# Patient Record
Sex: MALE | Race: WHITE | NOT HISPANIC OR LATINO | Employment: OTHER | URBAN - METROPOLITAN AREA
[De-identification: names, ages, dates, MRNs, and addresses within clinical notes are randomized per-mention and may not be internally consistent; named-entity substitution may affect disease eponyms.]

---

## 2019-06-22 ENCOUNTER — HOSPITAL ENCOUNTER (INPATIENT)
Facility: HOSPITAL | Age: 64
LOS: 2 days | Discharge: HOME | DRG: 432 | End: 2019-06-25
Attending: EMERGENCY MEDICINE | Admitting: INTERNAL MEDICINE
Payer: COMMERCIAL

## 2019-06-22 DIAGNOSIS — K76.6 PORTAL HYPERTENSION (HCC): ICD-10-CM

## 2019-06-22 DIAGNOSIS — K74.60 CIRRHOSIS OF LIVER (HCC): ICD-10-CM

## 2019-06-22 DIAGNOSIS — K92.2 GI BLEED: Primary | ICD-10-CM

## 2019-06-22 LAB
BASOPHILS # BLD AUTO: 0.08 THOUSANDS/ΜL (ref 0–0.1)
BASOPHILS NFR BLD AUTO: 1 % (ref 0–1)
EOSINOPHIL # BLD AUTO: 0.01 THOUSAND/ΜL (ref 0–0.61)
EOSINOPHIL NFR BLD AUTO: 0 % (ref 0–6)
ERYTHROCYTE [DISTWIDTH] IN BLOOD BY AUTOMATED COUNT: 14.2 % (ref 11.6–15.1)
HCT VFR BLD AUTO: 35.2 % (ref 36.5–49.3)
HGB BLD-MCNC: 12.3 G/DL (ref 12–17)
IMM GRANULOCYTES # BLD AUTO: 0.02 THOUSAND/UL (ref 0–0.2)
IMM GRANULOCYTES NFR BLD AUTO: 0 % (ref 0–2)
LYMPHOCYTES # BLD AUTO: 2.11 THOUSANDS/ΜL (ref 0.6–4.47)
LYMPHOCYTES NFR BLD AUTO: 24 % (ref 14–44)
MCH RBC QN AUTO: 38 PG (ref 26.8–34.3)
MCHC RBC AUTO-ENTMCNC: 34.9 G/DL (ref 31.4–37.4)
MCV RBC AUTO: 109 FL (ref 82–98)
MONOCYTES # BLD AUTO: 0.71 THOUSAND/ΜL (ref 0.17–1.22)
MONOCYTES NFR BLD AUTO: 8 % (ref 4–12)
NEUTROPHILS # BLD AUTO: 5.86 THOUSANDS/ΜL (ref 1.85–7.62)
NEUTS SEG NFR BLD AUTO: 67 % (ref 43–75)
NRBC BLD AUTO-RTO: 0 /100 WBCS
PLATELET # BLD AUTO: 142 THOUSANDS/UL (ref 149–390)
PMV BLD AUTO: 10.3 FL (ref 8.9–12.7)
RBC # BLD AUTO: 3.24 MILLION/UL (ref 3.88–5.62)
WBC # BLD AUTO: 8.79 THOUSAND/UL (ref 4.31–10.16)

## 2019-06-22 PROCEDURE — 96374 THER/PROPH/DIAG INJ IV PUSH: CPT

## 2019-06-22 PROCEDURE — 93005 ELECTROCARDIOGRAM TRACING: CPT

## 2019-06-22 PROCEDURE — 83690 ASSAY OF LIPASE: CPT | Performed by: PHYSICIAN ASSISTANT

## 2019-06-22 PROCEDURE — 80053 COMPREHEN METABOLIC PANEL: CPT | Performed by: PHYSICIAN ASSISTANT

## 2019-06-22 PROCEDURE — 99285 EMERGENCY DEPT VISIT HI MDM: CPT

## 2019-06-22 PROCEDURE — 85025 COMPLETE CBC W/AUTO DIFF WBC: CPT | Performed by: PHYSICIAN ASSISTANT

## 2019-06-22 PROCEDURE — 96375 TX/PRO/DX INJ NEW DRUG ADDON: CPT

## 2019-06-22 PROCEDURE — 36415 COLL VENOUS BLD VENIPUNCTURE: CPT | Performed by: PHYSICIAN ASSISTANT

## 2019-06-22 PROCEDURE — 96361 HYDRATE IV INFUSION ADD-ON: CPT

## 2019-06-22 RX ORDER — MORPHINE SULFATE 4 MG/ML
4 INJECTION, SOLUTION INTRAMUSCULAR; INTRAVENOUS ONCE
Status: COMPLETED | OUTPATIENT
Start: 2019-06-22 | End: 2019-06-22

## 2019-06-22 RX ORDER — ONDANSETRON 2 MG/ML
4 INJECTION INTRAMUSCULAR; INTRAVENOUS ONCE
Status: COMPLETED | OUTPATIENT
Start: 2019-06-22 | End: 2019-06-22

## 2019-06-22 RX ADMIN — MORPHINE SULFATE 4 MG: 4 INJECTION INTRAVENOUS at 23:50

## 2019-06-22 RX ADMIN — ONDANSETRON 4 MG: 2 INJECTION INTRAMUSCULAR; INTRAVENOUS at 23:50

## 2019-06-22 RX ADMIN — SODIUM CHLORIDE 1000 ML: 0.9 INJECTION, SOLUTION INTRAVENOUS at 23:53

## 2019-06-23 ENCOUNTER — APPOINTMENT (EMERGENCY)
Dept: CT IMAGING | Facility: HOSPITAL | Age: 64
DRG: 432 | End: 2019-06-23
Payer: COMMERCIAL

## 2019-06-23 PROBLEM — I10 HYPERTENSION: Status: ACTIVE | Noted: 2019-06-23

## 2019-06-23 PROBLEM — K92.2 GI BLEED: Status: ACTIVE | Noted: 2019-06-23

## 2019-06-23 LAB
ALBUMIN SERPL BCP-MCNC: 2.4 G/DL (ref 3.5–5)
ALBUMIN SERPL BCP-MCNC: 2.7 G/DL (ref 3.5–5)
ALP SERPL-CCNC: 74 U/L (ref 46–116)
ALP SERPL-CCNC: 88 U/L (ref 46–116)
ALT SERPL W P-5'-P-CCNC: 49 U/L (ref 12–78)
ALT SERPL W P-5'-P-CCNC: 59 U/L (ref 12–78)
ANION GAP SERPL CALCULATED.3IONS-SCNC: 7 MMOL/L (ref 4–13)
ANION GAP SERPL CALCULATED.3IONS-SCNC: 7 MMOL/L (ref 4–13)
AST SERPL W P-5'-P-CCNC: 65 U/L (ref 5–45)
AST SERPL W P-5'-P-CCNC: 75 U/L (ref 5–45)
ATRIAL RATE: 100 BPM
BASOPHILS # BLD AUTO: 0.14 THOUSANDS/ΜL (ref 0–0.1)
BASOPHILS # BLD MANUAL: 0 THOUSAND/UL (ref 0–0.1)
BASOPHILS NFR BLD AUTO: 2 % (ref 0–1)
BASOPHILS NFR MAR MANUAL: 0 % (ref 0–1)
BILIRUB SERPL-MCNC: 2 MG/DL (ref 0.2–1)
BILIRUB SERPL-MCNC: 3.2 MG/DL (ref 0.2–1)
BILIRUB UR QL STRIP: NEGATIVE
BUN SERPL-MCNC: 31 MG/DL (ref 5–25)
BUN SERPL-MCNC: 34 MG/DL (ref 5–25)
CALCIUM SERPL-MCNC: 8.4 MG/DL (ref 8.3–10.1)
CALCIUM SERPL-MCNC: 8.9 MG/DL (ref 8.3–10.1)
CHLORIDE SERPL-SCNC: 104 MMOL/L (ref 100–108)
CHLORIDE SERPL-SCNC: 107 MMOL/L (ref 100–108)
CLARITY UR: CLEAR
CO2 SERPL-SCNC: 27 MMOL/L (ref 21–32)
CO2 SERPL-SCNC: 28 MMOL/L (ref 21–32)
COLOR UR: NORMAL
CREAT SERPL-MCNC: 0.73 MG/DL (ref 0.6–1.3)
CREAT SERPL-MCNC: 0.87 MG/DL (ref 0.6–1.3)
EOSINOPHIL # BLD AUTO: 0.1 THOUSAND/ΜL (ref 0–0.61)
EOSINOPHIL # BLD MANUAL: 0 THOUSAND/UL (ref 0–0.4)
EOSINOPHIL NFR BLD AUTO: 1 % (ref 0–6)
EOSINOPHIL NFR BLD MANUAL: 0 % (ref 0–6)
ERYTHROCYTE [DISTWIDTH] IN BLOOD BY AUTOMATED COUNT: 14.1 % (ref 11.6–15.1)
ERYTHROCYTE [DISTWIDTH] IN BLOOD BY AUTOMATED COUNT: 14.5 % (ref 11.6–15.1)
GFR SERPL CREATININE-BSD FRML MDRD: 92 ML/MIN/1.73SQ M
GFR SERPL CREATININE-BSD FRML MDRD: 99 ML/MIN/1.73SQ M
GLUCOSE SERPL-MCNC: 127 MG/DL (ref 65–140)
GLUCOSE SERPL-MCNC: 159 MG/DL (ref 65–140)
GLUCOSE UR STRIP-MCNC: NEGATIVE MG/DL
HCT VFR BLD AUTO: 31.4 % (ref 36.5–49.3)
HCT VFR BLD AUTO: 31.4 % (ref 36.5–49.3)
HGB BLD-MCNC: 10.8 G/DL (ref 12–17)
HGB BLD-MCNC: 10.9 G/DL (ref 12–17)
HGB UR QL STRIP.AUTO: NEGATIVE
IMM GRANULOCYTES # BLD AUTO: 0.03 THOUSAND/UL (ref 0–0.2)
IMM GRANULOCYTES NFR BLD AUTO: 0 % (ref 0–2)
INR PPP: 1.54 (ref 0.86–1.17)
KETONES UR STRIP-MCNC: NEGATIVE MG/DL
LEUKOCYTE ESTERASE UR QL STRIP: NEGATIVE
LIPASE SERPL-CCNC: 37 U/L (ref 73–393)
LYMPHOCYTES # BLD AUTO: 2.11 THOUSAND/UL (ref 0.6–4.47)
LYMPHOCYTES # BLD AUTO: 3.11 THOUSANDS/ΜL (ref 0.6–4.47)
LYMPHOCYTES # BLD AUTO: 35 % (ref 14–44)
LYMPHOCYTES NFR BLD AUTO: 33 % (ref 14–44)
MACROCYTES BLD QL AUTO: PRESENT
MAGNESIUM SERPL-MCNC: 1.5 MG/DL (ref 1.6–2.6)
MCH RBC QN AUTO: 37.4 PG (ref 26.8–34.3)
MCH RBC QN AUTO: 38.4 PG (ref 26.8–34.3)
MCHC RBC AUTO-ENTMCNC: 34.4 G/DL (ref 31.4–37.4)
MCHC RBC AUTO-ENTMCNC: 34.7 G/DL (ref 31.4–37.4)
MCV RBC AUTO: 109 FL (ref 82–98)
MCV RBC AUTO: 111 FL (ref 82–98)
MONOCYTES # BLD AUTO: 0.24 THOUSAND/UL (ref 0–1.22)
MONOCYTES # BLD AUTO: 0.9 THOUSAND/ΜL (ref 0.17–1.22)
MONOCYTES NFR BLD AUTO: 10 % (ref 4–12)
MONOCYTES NFR BLD: 4 % (ref 4–12)
NEUTROPHILS # BLD AUTO: 5.19 THOUSANDS/ΜL (ref 1.85–7.62)
NEUTROPHILS # BLD MANUAL: 3.67 THOUSAND/UL (ref 1.85–7.62)
NEUTS SEG NFR BLD AUTO: 54 % (ref 43–75)
NEUTS SEG NFR BLD AUTO: 61 % (ref 43–75)
NITRITE UR QL STRIP: NEGATIVE
NRBC BLD AUTO-RTO: 0 /100 WBCS
NRBC BLD AUTO-RTO: 0 /100 WBCS
P AXIS: 66 DEGREES
PH UR STRIP.AUTO: 6.5 [PH]
PHOSPHATE SERPL-MCNC: 3.5 MG/DL (ref 2.3–4.1)
PLATELET # BLD AUTO: 121 THOUSANDS/UL (ref 149–390)
PLATELET # BLD AUTO: 140 THOUSANDS/UL (ref 149–390)
PLATELET BLD QL SMEAR: ABNORMAL
PMV BLD AUTO: 10.2 FL (ref 8.9–12.7)
PMV BLD AUTO: 10.4 FL (ref 8.9–12.7)
POTASSIUM SERPL-SCNC: 3.8 MMOL/L (ref 3.5–5.3)
POTASSIUM SERPL-SCNC: 4.3 MMOL/L (ref 3.5–5.3)
PR INTERVAL: 148 MS
PROT SERPL-MCNC: 5.7 G/DL (ref 6.4–8.2)
PROT SERPL-MCNC: 6.4 G/DL (ref 6.4–8.2)
PROT UR STRIP-MCNC: NEGATIVE MG/DL
PROTHROMBIN TIME: 18.3 SECONDS (ref 11.8–14.2)
QRS AXIS: 19 DEGREES
QRSD INTERVAL: 80 MS
QT INTERVAL: 348 MS
QTC INTERVAL: 448 MS
RBC # BLD AUTO: 2.84 MILLION/UL (ref 3.88–5.62)
RBC # BLD AUTO: 2.89 MILLION/UL (ref 3.88–5.62)
SODIUM SERPL-SCNC: 139 MMOL/L (ref 136–145)
SODIUM SERPL-SCNC: 141 MMOL/L (ref 136–145)
SP GR UR STRIP.AUTO: 1.01 (ref 1–1.03)
T WAVE AXIS: 74 DEGREES
TOTAL CELLS COUNTED SPEC: 100
UROBILINOGEN UR QL STRIP.AUTO: 0.2 E.U./DL
VENTRICULAR RATE: 100 BPM
WBC # BLD AUTO: 6.02 THOUSAND/UL (ref 4.31–10.16)
WBC # BLD AUTO: 9.47 THOUSAND/UL (ref 4.31–10.16)

## 2019-06-23 PROCEDURE — 80053 COMPREHEN METABOLIC PANEL: CPT | Performed by: PHYSICIAN ASSISTANT

## 2019-06-23 PROCEDURE — 99222 1ST HOSP IP/OBS MODERATE 55: CPT | Performed by: INTERNAL MEDICINE

## 2019-06-23 PROCEDURE — 85610 PROTHROMBIN TIME: CPT | Performed by: PHYSICIAN ASSISTANT

## 2019-06-23 PROCEDURE — 99223 1ST HOSP IP/OBS HIGH 75: CPT | Performed by: GENERAL PRACTICE

## 2019-06-23 PROCEDURE — 96361 HYDRATE IV INFUSION ADD-ON: CPT

## 2019-06-23 PROCEDURE — 83735 ASSAY OF MAGNESIUM: CPT | Performed by: PHYSICIAN ASSISTANT

## 2019-06-23 PROCEDURE — 84100 ASSAY OF PHOSPHORUS: CPT | Performed by: PHYSICIAN ASSISTANT

## 2019-06-23 PROCEDURE — 99285 EMERGENCY DEPT VISIT HI MDM: CPT | Performed by: PHYSICIAN ASSISTANT

## 2019-06-23 PROCEDURE — 85027 COMPLETE CBC AUTOMATED: CPT | Performed by: PHYSICIAN ASSISTANT

## 2019-06-23 PROCEDURE — 93010 ELECTROCARDIOGRAM REPORT: CPT | Performed by: INTERNAL MEDICINE

## 2019-06-23 PROCEDURE — 85007 BL SMEAR W/DIFF WBC COUNT: CPT | Performed by: PHYSICIAN ASSISTANT

## 2019-06-23 PROCEDURE — 36415 COLL VENOUS BLD VENIPUNCTURE: CPT | Performed by: PHYSICIAN ASSISTANT

## 2019-06-23 PROCEDURE — 85025 COMPLETE CBC W/AUTO DIFF WBC: CPT | Performed by: PHYSICIAN ASSISTANT

## 2019-06-23 PROCEDURE — C9113 INJ PANTOPRAZOLE SODIUM, VIA: HCPCS | Performed by: PHYSICIAN ASSISTANT

## 2019-06-23 PROCEDURE — 81003 URINALYSIS AUTO W/O SCOPE: CPT | Performed by: PHYSICIAN ASSISTANT

## 2019-06-23 PROCEDURE — 74177 CT ABD & PELVIS W/CONTRAST: CPT

## 2019-06-23 RX ORDER — NICOTINE 21 MG/24HR
1 PATCH, TRANSDERMAL 24 HOURS TRANSDERMAL DAILY
Status: DISCONTINUED | OUTPATIENT
Start: 2019-06-23 | End: 2019-06-25

## 2019-06-23 RX ORDER — MORPHINE SULFATE 4 MG/ML
4 INJECTION, SOLUTION INTRAMUSCULAR; INTRAVENOUS EVERY 6 HOURS PRN
Status: DISCONTINUED | OUTPATIENT
Start: 2019-06-23 | End: 2019-06-25 | Stop reason: HOSPADM

## 2019-06-23 RX ORDER — MAGNESIUM SULFATE 1 G/100ML
1 INJECTION INTRAVENOUS ONCE
Status: COMPLETED | OUTPATIENT
Start: 2019-06-23 | End: 2019-06-23

## 2019-06-23 RX ADMIN — MORPHINE SULFATE 4 MG: 4 INJECTION INTRAVENOUS at 05:24

## 2019-06-23 RX ADMIN — SODIUM CHLORIDE 8 MG/HR: 9 INJECTION, SOLUTION INTRAVENOUS at 14:06

## 2019-06-23 RX ADMIN — OCTREOTIDE ACETATE 25 MCG/HR: 500 INJECTION, SOLUTION INTRAVENOUS; SUBCUTANEOUS at 10:02

## 2019-06-23 RX ADMIN — MAGNESIUM SULFATE HEPTAHYDRATE 1 G: 1 INJECTION, SOLUTION INTRAVENOUS at 14:08

## 2019-06-23 RX ADMIN — SODIUM CHLORIDE 8 MG/HR: 9 INJECTION, SOLUTION INTRAVENOUS at 05:23

## 2019-06-23 RX ADMIN — IOHEXOL 100 ML: 350 INJECTION, SOLUTION INTRAVENOUS at 01:00

## 2019-06-23 RX ADMIN — CEFTRIAXONE SODIUM 1000 MG: 10 INJECTION, POWDER, FOR SOLUTION INTRAVENOUS at 02:40

## 2019-06-24 ENCOUNTER — ANESTHESIA (INPATIENT)
Dept: GASTROENTEROLOGY | Facility: HOSPITAL | Age: 64
DRG: 432 | End: 2019-06-24
Payer: COMMERCIAL

## 2019-06-24 ENCOUNTER — APPOINTMENT (INPATIENT)
Dept: GASTROENTEROLOGY | Facility: HOSPITAL | Age: 64
DRG: 432 | End: 2019-06-24
Payer: COMMERCIAL

## 2019-06-24 ENCOUNTER — APPOINTMENT (INPATIENT)
Dept: INTERVENTIONAL RADIOLOGY/VASCULAR | Facility: HOSPITAL | Age: 64
DRG: 432 | End: 2019-06-24
Payer: COMMERCIAL

## 2019-06-24 ENCOUNTER — ANESTHESIA EVENT (INPATIENT)
Dept: GASTROENTEROLOGY | Facility: HOSPITAL | Age: 64
DRG: 432 | End: 2019-06-24
Payer: COMMERCIAL

## 2019-06-24 PROBLEM — K70.30 ALCOHOLIC CIRRHOSIS (HCC): Status: ACTIVE | Noted: 2019-06-24

## 2019-06-24 LAB
ABO GROUP BLD: NORMAL
AFP-TM SERPL-MCNC: 3.5 NG/ML (ref 0.5–8)
ANION GAP SERPL CALCULATED.3IONS-SCNC: 7 MMOL/L (ref 4–13)
APTT PPP: 35 SECONDS (ref 26–38)
BASOPHILS # BLD AUTO: 0.08 THOUSANDS/ΜL (ref 0–0.1)
BASOPHILS # BLD MANUAL: 0 THOUSAND/UL (ref 0–0.1)
BASOPHILS NFR BLD AUTO: 1 % (ref 0–1)
BASOPHILS NFR MAR MANUAL: 0 % (ref 0–1)
BLD GP AB SCN SERPL QL: NEGATIVE
BUN SERPL-MCNC: 31 MG/DL (ref 5–25)
CALCIUM SERPL-MCNC: 8.2 MG/DL (ref 8.3–10.1)
CHLORIDE SERPL-SCNC: 106 MMOL/L (ref 100–108)
CO2 SERPL-SCNC: 26 MMOL/L (ref 21–32)
CREAT SERPL-MCNC: 0.77 MG/DL (ref 0.6–1.3)
EOSINOPHIL # BLD AUTO: 0.22 THOUSAND/ΜL (ref 0–0.61)
EOSINOPHIL # BLD MANUAL: 0.3 THOUSAND/UL (ref 0–0.4)
EOSINOPHIL NFR BLD AUTO: 4 % (ref 0–6)
EOSINOPHIL NFR BLD MANUAL: 7 % (ref 0–6)
ERYTHROCYTE [DISTWIDTH] IN BLOOD BY AUTOMATED COUNT: 14.2 % (ref 11.6–15.1)
ERYTHROCYTE [DISTWIDTH] IN BLOOD BY AUTOMATED COUNT: 14.3 % (ref 11.6–15.1)
GFR SERPL CREATININE-BSD FRML MDRD: 97 ML/MIN/1.73SQ M
GLUCOSE SERPL-MCNC: 108 MG/DL (ref 65–140)
HAV IGM SER QL: NORMAL
HBV CORE IGM SER QL: NORMAL
HBV SURFACE AG SER QL: NORMAL
HCT VFR BLD AUTO: 27.9 % (ref 36.5–49.3)
HCT VFR BLD AUTO: 28 % (ref 36.5–49.3)
HCV AB SER QL: NORMAL
HCV AB SER QL: NORMAL
HGB BLD-MCNC: 9.6 G/DL (ref 12–17)
HGB BLD-MCNC: 9.8 G/DL (ref 12–17)
IMM GRANULOCYTES # BLD AUTO: 0.01 THOUSAND/UL (ref 0–0.2)
IMM GRANULOCYTES NFR BLD AUTO: 0 % (ref 0–2)
INR PPP: 1.44 (ref 0.86–1.17)
LYMPHOCYTES # BLD AUTO: 1.67 THOUSAND/UL (ref 0.6–4.47)
LYMPHOCYTES # BLD AUTO: 2.53 THOUSANDS/ΜL (ref 0.6–4.47)
LYMPHOCYTES # BLD AUTO: 39 % (ref 14–44)
LYMPHOCYTES NFR BLD AUTO: 46 % (ref 14–44)
MAGNESIUM SERPL-MCNC: 1.8 MG/DL (ref 1.6–2.6)
MCH RBC QN AUTO: 37.8 PG (ref 26.8–34.3)
MCH RBC QN AUTO: 38.1 PG (ref 26.8–34.3)
MCHC RBC AUTO-ENTMCNC: 34.4 G/DL (ref 31.4–37.4)
MCHC RBC AUTO-ENTMCNC: 35 G/DL (ref 31.4–37.4)
MCV RBC AUTO: 109 FL (ref 82–98)
MCV RBC AUTO: 110 FL (ref 82–98)
MONOCYTES # BLD AUTO: 0.3 THOUSAND/UL (ref 0–1.22)
MONOCYTES # BLD AUTO: 0.58 THOUSAND/ΜL (ref 0.17–1.22)
MONOCYTES NFR BLD AUTO: 10 % (ref 4–12)
MONOCYTES NFR BLD: 7 % (ref 4–12)
NEUTROPHILS # BLD AUTO: 2.22 THOUSANDS/ΜL (ref 1.85–7.62)
NEUTROPHILS # BLD MANUAL: 1.88 THOUSAND/UL (ref 1.85–7.62)
NEUTS SEG NFR BLD AUTO: 39 % (ref 43–75)
NEUTS SEG NFR BLD AUTO: 44 % (ref 43–75)
NRBC BLD AUTO-RTO: 0 /100 WBCS
NRBC BLD AUTO-RTO: 0 /100 WBCS
PLATELET # BLD AUTO: 105 THOUSANDS/UL (ref 149–390)
PLATELET # BLD AUTO: 92 THOUSANDS/UL (ref 149–390)
PLATELET BLD QL SMEAR: ABNORMAL
PMV BLD AUTO: 10.1 FL (ref 8.9–12.7)
PMV BLD AUTO: 10.2 FL (ref 8.9–12.7)
POTASSIUM SERPL-SCNC: 4 MMOL/L (ref 3.5–5.3)
PROTHROMBIN TIME: 17.4 SECONDS (ref 11.8–14.2)
RBC # BLD AUTO: 2.54 MILLION/UL (ref 3.88–5.62)
RBC # BLD AUTO: 2.57 MILLION/UL (ref 3.88–5.62)
RH BLD: POSITIVE
SODIUM SERPL-SCNC: 139 MMOL/L (ref 136–145)
SPECIMEN EXPIRATION DATE: NORMAL
TOTAL CELLS COUNTED SPEC: 100
VARIANT LYMPHS # BLD AUTO: 3 %
WBC # BLD AUTO: 4.27 THOUSAND/UL (ref 4.31–10.16)
WBC # BLD AUTO: 5.64 THOUSAND/UL (ref 4.31–10.16)

## 2019-06-24 PROCEDURE — 86256 FLUORESCENT ANTIBODY TITER: CPT | Performed by: PHYSICIAN ASSISTANT

## 2019-06-24 PROCEDURE — 86803 HEPATITIS C AB TEST: CPT | Performed by: PHYSICIAN ASSISTANT

## 2019-06-24 PROCEDURE — 86038 ANTINUCLEAR ANTIBODIES: CPT | Performed by: PHYSICIAN ASSISTANT

## 2019-06-24 PROCEDURE — 82105 ALPHA-FETOPROTEIN SERUM: CPT | Performed by: PHYSICIAN ASSISTANT

## 2019-06-24 PROCEDURE — 99232 SBSQ HOSP IP/OBS MODERATE 35: CPT | Performed by: GENERAL PRACTICE

## 2019-06-24 PROCEDURE — 85025 COMPLETE CBC W/AUTO DIFF WBC: CPT | Performed by: PHYSICIAN ASSISTANT

## 2019-06-24 PROCEDURE — 85007 BL SMEAR W/DIFF WBC COUNT: CPT | Performed by: PHYSICIAN ASSISTANT

## 2019-06-24 PROCEDURE — 06L38CZ OCCLUSION OF ESOPHAGEAL VEIN WITH EXTRALUMINAL DEVICE, VIA NATURAL OR ARTIFICIAL OPENING ENDOSCOPIC: ICD-10-PCS | Performed by: INTERNAL MEDICINE

## 2019-06-24 PROCEDURE — 85610 PROTHROMBIN TIME: CPT | Performed by: PHYSICIAN ASSISTANT

## 2019-06-24 PROCEDURE — 80074 ACUTE HEPATITIS PANEL: CPT | Performed by: PHYSICIAN ASSISTANT

## 2019-06-24 PROCEDURE — C9113 INJ PANTOPRAZOLE SODIUM, VIA: HCPCS | Performed by: PHYSICIAN ASSISTANT

## 2019-06-24 PROCEDURE — 80048 BASIC METABOLIC PNL TOTAL CA: CPT | Performed by: GENERAL PRACTICE

## 2019-06-24 PROCEDURE — 76705 ECHO EXAM OF ABDOMEN: CPT | Performed by: RADIOLOGY

## 2019-06-24 PROCEDURE — 85730 THROMBOPLASTIN TIME PARTIAL: CPT | Performed by: PHYSICIAN ASSISTANT

## 2019-06-24 PROCEDURE — 85027 COMPLETE CBC AUTOMATED: CPT | Performed by: PHYSICIAN ASSISTANT

## 2019-06-24 PROCEDURE — 86901 BLOOD TYPING SEROLOGIC RH(D): CPT | Performed by: PHYSICIAN ASSISTANT

## 2019-06-24 PROCEDURE — 86235 NUCLEAR ANTIGEN ANTIBODY: CPT | Performed by: PHYSICIAN ASSISTANT

## 2019-06-24 PROCEDURE — 43244 EGD VARICES LIGATION: CPT | Performed by: INTERNAL MEDICINE

## 2019-06-24 PROCEDURE — 86850 RBC ANTIBODY SCREEN: CPT | Performed by: PHYSICIAN ASSISTANT

## 2019-06-24 PROCEDURE — 86900 BLOOD TYPING SEROLOGIC ABO: CPT | Performed by: PHYSICIAN ASSISTANT

## 2019-06-24 PROCEDURE — 83735 ASSAY OF MAGNESIUM: CPT | Performed by: GENERAL PRACTICE

## 2019-06-24 RX ORDER — PROPOFOL 10 MG/ML
INJECTION, EMULSION INTRAVENOUS AS NEEDED
Status: DISCONTINUED | OUTPATIENT
Start: 2019-06-24 | End: 2019-06-24 | Stop reason: SURG

## 2019-06-24 RX ORDER — LIDOCAINE HYDROCHLORIDE 10 MG/ML
INJECTION, SOLUTION INFILTRATION; PERINEURAL AS NEEDED
Status: DISCONTINUED | OUTPATIENT
Start: 2019-06-24 | End: 2019-06-24 | Stop reason: SURG

## 2019-06-24 RX ORDER — HYDROMORPHONE HCL/PF 1 MG/ML
0.5 SYRINGE (ML) INJECTION EVERY 4 HOURS PRN
Status: DISCONTINUED | OUTPATIENT
Start: 2019-06-24 | End: 2019-06-25 | Stop reason: HOSPADM

## 2019-06-24 RX ORDER — SODIUM CHLORIDE, SODIUM LACTATE, POTASSIUM CHLORIDE, CALCIUM CHLORIDE 600; 310; 30; 20 MG/100ML; MG/100ML; MG/100ML; MG/100ML
INJECTION, SOLUTION INTRAVENOUS CONTINUOUS PRN
Status: DISCONTINUED | OUTPATIENT
Start: 2019-06-24 | End: 2019-06-24 | Stop reason: SURG

## 2019-06-24 RX ORDER — SODIUM CHLORIDE, SODIUM LACTATE, POTASSIUM CHLORIDE, CALCIUM CHLORIDE 600; 310; 30; 20 MG/100ML; MG/100ML; MG/100ML; MG/100ML
125 INJECTION, SOLUTION INTRAVENOUS CONTINUOUS
Status: DISCONTINUED | OUTPATIENT
Start: 2019-06-24 | End: 2019-06-24

## 2019-06-24 RX ADMIN — HYDROMORPHONE HYDROCHLORIDE 0.5 MG: 1 INJECTION, SOLUTION INTRAMUSCULAR; INTRAVENOUS; SUBCUTANEOUS at 18:32

## 2019-06-24 RX ADMIN — SODIUM CHLORIDE, SODIUM LACTATE, POTASSIUM CHLORIDE, AND CALCIUM CHLORIDE: .6; .31; .03; .02 INJECTION, SOLUTION INTRAVENOUS at 14:01

## 2019-06-24 RX ADMIN — LIDOCAINE HYDROCHLORIDE 10 ML: 20 SOLUTION ORAL; TOPICAL at 14:57

## 2019-06-24 RX ADMIN — MORPHINE SULFATE 4 MG: 4 INJECTION INTRAVENOUS at 15:56

## 2019-06-24 RX ADMIN — MORPHINE SULFATE 4 MG: 4 INJECTION INTRAVENOUS at 22:18

## 2019-06-24 RX ADMIN — PROPOFOL 150 MG: 10 INJECTION, EMULSION INTRAVENOUS at 14:15

## 2019-06-24 RX ADMIN — LIDOCAINE HYDROCHLORIDE 50 MG: 10 INJECTION, SOLUTION INFILTRATION; PERINEURAL at 14:15

## 2019-06-24 RX ADMIN — NICOTINE 1 PATCH: 21 PATCH TRANSDERMAL at 10:23

## 2019-06-24 RX ADMIN — SODIUM CHLORIDE 8 MG/HR: 9 INJECTION, SOLUTION INTRAVENOUS at 02:56

## 2019-06-24 RX ADMIN — PROPOFOL 50 MG: 10 INJECTION, EMULSION INTRAVENOUS at 14:22

## 2019-06-24 RX ADMIN — CEFTRIAXONE SODIUM 1000 MG: 10 INJECTION, POWDER, FOR SOLUTION INTRAVENOUS at 16:02

## 2019-06-25 ENCOUNTER — TELEPHONE (OUTPATIENT)
Dept: GASTROENTEROLOGY | Facility: CLINIC | Age: 64
End: 2019-06-25

## 2019-06-25 VITALS
HEART RATE: 76 BPM | BODY MASS INDEX: 30.1 KG/M2 | HEIGHT: 75 IN | SYSTOLIC BLOOD PRESSURE: 140 MMHG | WEIGHT: 242.06 LBS | TEMPERATURE: 97.9 F | DIASTOLIC BLOOD PRESSURE: 82 MMHG | RESPIRATION RATE: 18 BRPM | OXYGEN SATURATION: 91 %

## 2019-06-25 PROBLEM — K92.2 GI BLEED: Status: RESOLVED | Noted: 2019-06-23 | Resolved: 2019-06-25

## 2019-06-25 LAB
ACTIN IGG SERPL-ACNC: 10 UNITS (ref 0–19)
ANION GAP SERPL CALCULATED.3IONS-SCNC: 7 MMOL/L (ref 4–13)
BASOPHILS # BLD AUTO: 0.07 THOUSANDS/ΜL (ref 0–0.1)
BASOPHILS # BLD AUTO: 0.07 THOUSANDS/ΜL (ref 0–0.1)
BASOPHILS NFR BLD AUTO: 1 % (ref 0–1)
BASOPHILS NFR BLD AUTO: 1 % (ref 0–1)
BUN SERPL-MCNC: 24 MG/DL (ref 5–25)
CALCIUM SERPL-MCNC: 8 MG/DL (ref 8.3–10.1)
CHLORIDE SERPL-SCNC: 103 MMOL/L (ref 100–108)
CO2 SERPL-SCNC: 25 MMOL/L (ref 21–32)
CREAT SERPL-MCNC: 0.73 MG/DL (ref 0.6–1.3)
EOSINOPHIL # BLD AUTO: 0.34 THOUSAND/ΜL (ref 0–0.61)
EOSINOPHIL # BLD AUTO: 0.35 THOUSAND/ΜL (ref 0–0.61)
EOSINOPHIL NFR BLD AUTO: 7 % (ref 0–6)
EOSINOPHIL NFR BLD AUTO: 7 % (ref 0–6)
ERYTHROCYTE [DISTWIDTH] IN BLOOD BY AUTOMATED COUNT: 14.2 % (ref 11.6–15.1)
ERYTHROCYTE [DISTWIDTH] IN BLOOD BY AUTOMATED COUNT: 14.3 % (ref 11.6–15.1)
GFR SERPL CREATININE-BSD FRML MDRD: 99 ML/MIN/1.73SQ M
GLUCOSE SERPL-MCNC: 129 MG/DL (ref 65–140)
HCT VFR BLD AUTO: 28.1 % (ref 36.5–49.3)
HCT VFR BLD AUTO: 28.5 % (ref 36.5–49.3)
HGB BLD-MCNC: 10 G/DL (ref 12–17)
HGB BLD-MCNC: 9.8 G/DL (ref 12–17)
IMM GRANULOCYTES # BLD AUTO: 0.01 THOUSAND/UL (ref 0–0.2)
IMM GRANULOCYTES # BLD AUTO: 0.01 THOUSAND/UL (ref 0–0.2)
IMM GRANULOCYTES NFR BLD AUTO: 0 % (ref 0–2)
IMM GRANULOCYTES NFR BLD AUTO: 0 % (ref 0–2)
LYMPHOCYTES # BLD AUTO: 1.62 THOUSANDS/ΜL (ref 0.6–4.47)
LYMPHOCYTES # BLD AUTO: 1.96 THOUSANDS/ΜL (ref 0.6–4.47)
LYMPHOCYTES NFR BLD AUTO: 33 % (ref 14–44)
LYMPHOCYTES NFR BLD AUTO: 40 % (ref 14–44)
MCH RBC QN AUTO: 38.1 PG (ref 26.8–34.3)
MCH RBC QN AUTO: 38.3 PG (ref 26.8–34.3)
MCHC RBC AUTO-ENTMCNC: 34.9 G/DL (ref 31.4–37.4)
MCHC RBC AUTO-ENTMCNC: 35.1 G/DL (ref 31.4–37.4)
MCV RBC AUTO: 109 FL (ref 82–98)
MCV RBC AUTO: 109 FL (ref 82–98)
MITOCHONDRIA M2 IGG SER-ACNC: <20 UNITS (ref 0–20)
MONOCYTES # BLD AUTO: 0.44 THOUSAND/ΜL (ref 0.17–1.22)
MONOCYTES # BLD AUTO: 0.5 THOUSAND/ΜL (ref 0.17–1.22)
MONOCYTES NFR BLD AUTO: 10 % (ref 4–12)
MONOCYTES NFR BLD AUTO: 9 % (ref 4–12)
NEUTROPHILS # BLD AUTO: 2.05 THOUSANDS/ΜL (ref 1.85–7.62)
NEUTROPHILS # BLD AUTO: 2.47 THOUSANDS/ΜL (ref 1.85–7.62)
NEUTS SEG NFR BLD AUTO: 42 % (ref 43–75)
NEUTS SEG NFR BLD AUTO: 50 % (ref 43–75)
NRBC BLD AUTO-RTO: 0 /100 WBCS
NRBC BLD AUTO-RTO: 0 /100 WBCS
PLATELET # BLD AUTO: 107 THOUSANDS/UL (ref 149–390)
PLATELET # BLD AUTO: 99 THOUSANDS/UL (ref 149–390)
PMV BLD AUTO: 10.1 FL (ref 8.9–12.7)
PMV BLD AUTO: 9.9 FL (ref 8.9–12.7)
POTASSIUM SERPL-SCNC: 3.6 MMOL/L (ref 3.5–5.3)
RBC # BLD AUTO: 2.57 MILLION/UL (ref 3.88–5.62)
RBC # BLD AUTO: 2.61 MILLION/UL (ref 3.88–5.62)
SODIUM SERPL-SCNC: 135 MMOL/L (ref 136–145)
WBC # BLD AUTO: 4.94 THOUSAND/UL (ref 4.31–10.16)
WBC # BLD AUTO: 4.95 THOUSAND/UL (ref 4.31–10.16)

## 2019-06-25 PROCEDURE — 85025 COMPLETE CBC W/AUTO DIFF WBC: CPT | Performed by: PHYSICIAN ASSISTANT

## 2019-06-25 PROCEDURE — 85025 COMPLETE CBC W/AUTO DIFF WBC: CPT | Performed by: GENERAL PRACTICE

## 2019-06-25 PROCEDURE — 99239 HOSP IP/OBS DSCHRG MGMT >30: CPT | Performed by: GENERAL PRACTICE

## 2019-06-25 PROCEDURE — 80048 BASIC METABOLIC PNL TOTAL CA: CPT | Performed by: GENERAL PRACTICE

## 2019-06-25 PROCEDURE — 99232 SBSQ HOSP IP/OBS MODERATE 35: CPT | Performed by: PHYSICIAN ASSISTANT

## 2019-06-25 RX ORDER — CIPROFLOXACIN 250 MG/1
250 TABLET, FILM COATED ORAL EVERY 12 HOURS SCHEDULED
Qty: 8 TABLET | Refills: 0 | Status: SHIPPED | OUTPATIENT
Start: 2019-06-25 | End: 2019-06-29

## 2019-06-25 RX ORDER — CIPROFLOXACIN 250 MG/1
250 TABLET, FILM COATED ORAL EVERY 12 HOURS SCHEDULED
Qty: 6 TABLET | Refills: 0 | Status: SHIPPED | OUTPATIENT
Start: 2019-06-25 | End: 2019-06-25 | Stop reason: SDUPTHER

## 2019-06-25 RX ADMIN — MORPHINE SULFATE 4 MG: 4 INJECTION INTRAVENOUS at 07:35

## 2019-06-25 RX ADMIN — HYDROMORPHONE HYDROCHLORIDE 0.5 MG: 1 INJECTION, SOLUTION INTRAMUSCULAR; INTRAVENOUS; SUBCUTANEOUS at 01:35

## 2019-06-26 LAB — RYE IGE QN: NEGATIVE

## 2019-07-03 ENCOUNTER — TELEPHONE (OUTPATIENT)
Dept: GASTROENTEROLOGY | Facility: CLINIC | Age: 64
End: 2019-07-03

## 2019-07-03 NOTE — TELEPHONE ENCOUNTER
----- Message from Sher Hunter MA sent at 7/3/2019  8:02 AM EDT -----  Regarding: repeat EGD  Per Dr Oswald Nunez note, patient needs a repeat EGD in 8-12 weeks  Thank you!

## 2019-07-17 ENCOUNTER — PREP FOR PROCEDURE (OUTPATIENT)
Dept: GASTROENTEROLOGY | Facility: CLINIC | Age: 64
End: 2019-07-17

## 2019-07-17 ENCOUNTER — OFFICE VISIT (OUTPATIENT)
Dept: GASTROENTEROLOGY | Facility: CLINIC | Age: 64
End: 2019-07-17
Payer: COMMERCIAL

## 2019-07-17 VITALS
DIASTOLIC BLOOD PRESSURE: 78 MMHG | BODY MASS INDEX: 30.93 KG/M2 | WEIGHT: 248.8 LBS | HEART RATE: 90 BPM | HEIGHT: 75 IN | SYSTOLIC BLOOD PRESSURE: 142 MMHG

## 2019-07-17 DIAGNOSIS — K70.30 ALCOHOLIC CIRRHOSIS OF LIVER WITHOUT ASCITES (HCC): ICD-10-CM

## 2019-07-17 DIAGNOSIS — I85.10 SECONDARY ESOPHAGEAL VARICES WITHOUT BLEEDING (HCC): ICD-10-CM

## 2019-07-17 DIAGNOSIS — D50.0 IRON DEFICIENCY ANEMIA DUE TO CHRONIC BLOOD LOSS: Primary | ICD-10-CM

## 2019-07-17 PROCEDURE — 99214 OFFICE O/P EST MOD 30 MIN: CPT | Performed by: INTERNAL MEDICINE

## 2019-07-17 RX ORDER — SENNOSIDES 8.6 MG
650 CAPSULE ORAL EVERY 8 HOURS PRN
COMMUNITY
End: 2020-10-16 | Stop reason: HOSPADM

## 2019-07-17 NOTE — PROGRESS NOTES
[facility] - Outpatient Follow-up Note  Chuck Francisco 61 y o  male MRN: [id number]  Encounter: [id number]          ASSESSMENT AND PLAN:      1  Iron deficiency anemia due to chronic blood loss    - CBC and differential; Future  - Comprehensive metabolic panel; Future    2  Alcoholic cirrhosis of liver without ascites ([facility] )  -patient has portal hypertension complicated by esophageal varices status post banding in June of 2019  -encephalopathy:  None  -hepatocellular carcinoma screening was performed in June of 2019  The alpha fetoprotein level was normal and the CT scan of the abdomen did not show evidence of a hepatic mass  -spontaneous bacterial peritonitis:  None  -ascites:  None    3  Secondary esophageal varices without bleeding (HCC)    Will repeat his CBC as well as a comprehensive metabolic panel    Rescheduled esophagogastroduodenoscopy with possible banding and the 1st 2 weeks of September 2019    Patient was advised, once again, to discontinue all alcohol permanently  ______________________________________________________________________    SUBJECTIVE:  This 20-year-old gentleman returns to the office today as the 1st follow-up patient appointment since his discharge from the hospital   When he was admitted to the hospital he was found to have hematemesis and melena in association with severe anemia secondary to esophageal variceal bleeding  Esophagogastroduodenoscopy performed June 24, 2019 showed several columns of esophageal varices with stigmata in consequently 4 bands were deployed successfully  Since discharge the patient states he has not seen any further evidence of melena or hematemesis  He denies any abdominal pain  He denies any difficulty with swallowing, nausea, vomiting, bloating  He does admit to fatigue  He states that he has not drank any alcohol since his discharge  REVIEW OF SYSTEMS IS OTHERWISE NEGATIVE        Historical Information   Past Medical History:   Diagnosis Date    Alcohol abuse     Cirrhosis ([facility] )     Varices, esophageal (HCC)      Past Surgical History:   Procedure Laterality Date    CATARACT EXTRACTION      CYST REMOVAL      JOINT REPLACEMENT Bilateral     RHINOPLASTY      TOTAL HIP ARTHROPLASTY       Social History   Social History     Substance and Sexual Activity   Alcohol Use Not Currently    Frequency: 4 or more times a week    Drinks per session: 7 to 9     Social History     Substance and Sexual Activity   Drug Use Not Currently     Social History     Tobacco Use   Smoking Status Current Every Day Smoker    Packs/day: 0 00    Last attempt to quit: 2019    Years since quittin 0   Smokeless Tobacco Never Used     Family History   Problem Relation Age of Onset    Aneurysm Mother     Alzheimer's disease Father     Lung cancer Son        Meds/Allergies       Current Outpatient Medications:     acetaminophen (TYLENOL) 650 mg CR tablet    nicotine (NICODERM CQ) 7 mg/24hr TD 24 hr patch    Allergies   Allergen Reactions    Cat Hair Extract     Citalopram      nausea, voimiting and diarrhea           Objective     Blood pressure 142/78, pulse 90, height 6' 3" (1 905 m), weight 113 kg (248 lb 12 8 oz)  Body mass index is 31 1 kg/m²  PHYSICAL EXAM:      General Appearance:   Alert, cooperative, no distress   HEENT:   Normocephalic, atraumatic, anicteric      Neck:  Supple, symmetrical, trachea midline   Lungs:   Clear to auscultation bilaterally; no rales, rhonchi or wheezing; respirations unlabored    Heart[de-identified]   Regular rate and rhythm; no murmur, rub, or gallop     Abdomen:   Soft, non-tender, non-distended; normal bowel sounds; no masses, no organomegaly    Genitalia:   Deferred    Rectal:   Deferred    Extremities:  No cyanosis, clubbing or edema    Pulses:  2+ and symmetric    Skin:  No jaundice, rashes, or lesions    Lymph nodes:  No palpable cervical lymphadenopathy        Lab Results:   No visits with results within 1 Day(s) from this visit  Latest known visit with results is:   No results displayed because visit has over 200 results  Radiology Results:   Ct Abdomen Pelvis With Contrast    Result Date: 6/23/2019  Narrative: CT ABDOMEN AND PELVIS WITH IV CONTRAST INDICATION:   Abdominal pain, unspecified  COMPARISON:  September 28, 2009 TECHNIQUE:  CT examination of the abdomen and pelvis was performed  Axial, sagittal, and coronal 2D reformatted images were created from the source data and submitted for interpretation  Radiation dose length product (DLP) for this visit:  1052 mGy-cm   This examination, like all CT scans performed in the [facility], was performed utilizing techniques to minimize radiation dose exposure, including the use of iterative reconstruction and automated exposure control  IV Contrast:  100 mL of iohexol (OMNIPAQUE) Enteric Contrast:  Enteric contrast was not administered  FINDINGS: ABDOMEN LOWER CHEST:  No clinically significant abnormality identified in the visualized lower chest  LIVER/BILIARY TREE:  Cirrhotic liver is seen  Small hypodensity within the dome of the liver is again visualized, likely representing a cyst  GALLBLADDER:  No calcified gallstones  No pericholecystic inflammatory change  SPLEEN:  The spleen is enlarged, measuring  15 cm PANCREAS:  Unremarkable  ADRENAL GLANDS:  Unremarkable  KIDNEYS/URETERS:  Pelvic left kidney is seen  No evidence of hydronephrosis  Small hypodensities in the kidneys are visualized STOMACH AND BOWEL:  Unremarkable  APPENDIX:  No findings to suggest appendicitis  ABDOMINOPELVIC CAVITY:  Inflammatory changes and free fluid is seen in the mid hemiabdomen, left retroperitoneal region and right lower quadrant  Multiple upper abdominal lymph nodes measuring up to 1 cm and retroperitoneal lymph nodes are seen  VESSELS:  Unremarkable for patient's age  PELVIS    Limited evaluation of the pelvis due to streak artifact from patient's bilateral hip arthroplasties REPRODUCTIVE ORGANS:  Unremarkable for patient's age  URINARY BLADDER:  Unremarkable  ABDOMINAL WALL/INGUINAL REGIONS:  Unremarkable  OSSEOUS STRUCTURES:  Bilateral hip arthroplasties are seen  Degenerative changes in the spine are seen  Impression: Cirrhotic liver with features of portal hypertension  Indeterminate nonspecific inflammatory changes and free fluid in the mid hemiabdomen, left retroperitoneal region and right lower quadrant     Findings of unclear etiology  Clinical correlation is recommended Workstation performed: [id number]     Ir Other    Result Date: 6/25/2019  Narrative: Limited ultrasound evaluation of the abdomen for paracentesis planning  Clinical History: Ascites  Procedure: After explaining the risks and benefits of the procedure to the patient, informed consent was obtained  Ultrasound was used for focused evaluation of the abdomen for paracentesis planning  No significant fluid was identified  Representative  images were obtained of the 4 quadrants of the abdomen  Impression: Impression: Limited ultrasound of the abdomen for paracentesis planning  No significant ascites identified therefore no paracentesis was performed   Workstation performed: [id number]

## 2019-09-08 ENCOUNTER — ANESTHESIA EVENT (OUTPATIENT)
Dept: GASTROENTEROLOGY | Facility: HOSPITAL | Age: 64
End: 2019-09-08

## 2019-09-09 ENCOUNTER — TREATMENT (OUTPATIENT)
Dept: GASTROENTEROLOGY | Facility: CLINIC | Age: 64
End: 2019-09-09

## 2019-09-09 ENCOUNTER — ANESTHESIA (OUTPATIENT)
Dept: GASTROENTEROLOGY | Facility: HOSPITAL | Age: 64
End: 2019-09-09

## 2019-09-09 ENCOUNTER — HOSPITAL ENCOUNTER (OUTPATIENT)
Dept: GASTROENTEROLOGY | Facility: HOSPITAL | Age: 64
Setting detail: OUTPATIENT SURGERY
Discharge: HOME | End: 2019-09-09
Attending: INTERNAL MEDICINE | Admitting: INTERNAL MEDICINE
Payer: COMMERCIAL

## 2019-09-09 VITALS
OXYGEN SATURATION: 97 % | BODY MASS INDEX: 28.48 KG/M2 | HEIGHT: 75 IN | SYSTOLIC BLOOD PRESSURE: 177 MMHG | HEART RATE: 70 BPM | DIASTOLIC BLOOD PRESSURE: 92 MMHG | TEMPERATURE: 97.9 F | RESPIRATION RATE: 18 BRPM | WEIGHT: 229.06 LBS

## 2019-09-09 DIAGNOSIS — I85.10 SECONDARY ESOPHAGEAL VARICES WITHOUT BLEEDING (HCC): ICD-10-CM

## 2019-09-09 DIAGNOSIS — K25.3 ACUTE GASTRIC ULCER WITHOUT HEMORRHAGE OR PERFORATION: Primary | ICD-10-CM

## 2019-09-09 DIAGNOSIS — D50.0 IRON DEFICIENCY ANEMIA DUE TO CHRONIC BLOOD LOSS: ICD-10-CM

## 2019-09-09 DIAGNOSIS — K21.9 GASTROESOPHAGEAL REFLUX DISEASE WITHOUT ESOPHAGITIS: Primary | ICD-10-CM

## 2019-09-09 PROCEDURE — 88305 TISSUE EXAM BY PATHOLOGIST: CPT | Performed by: PATHOLOGY

## 2019-09-09 PROCEDURE — 43239 EGD BIOPSY SINGLE/MULTIPLE: CPT | Performed by: INTERNAL MEDICINE

## 2019-09-09 RX ORDER — LIDOCAINE HYDROCHLORIDE 10 MG/ML
INJECTION, SOLUTION INFILTRATION; PERINEURAL AS NEEDED
Status: DISCONTINUED | OUTPATIENT
Start: 2019-09-09 | End: 2019-09-09 | Stop reason: SURG

## 2019-09-09 RX ORDER — PROPOFOL 10 MG/ML
INJECTION, EMULSION INTRAVENOUS AS NEEDED
Status: DISCONTINUED | OUTPATIENT
Start: 2019-09-09 | End: 2019-09-09 | Stop reason: SURG

## 2019-09-09 RX ORDER — SODIUM CHLORIDE, SODIUM LACTATE, POTASSIUM CHLORIDE, CALCIUM CHLORIDE 600; 310; 30; 20 MG/100ML; MG/100ML; MG/100ML; MG/100ML
125 INJECTION, SOLUTION INTRAVENOUS CONTINUOUS
Status: DISCONTINUED | OUTPATIENT
Start: 2019-09-09 | End: 2019-09-13 | Stop reason: HOSPADM

## 2019-09-09 RX ORDER — PANTOPRAZOLE SODIUM 40 MG/1
40 TABLET, DELAYED RELEASE ORAL DAILY
Qty: 31 TABLET | Refills: 6 | Status: SHIPPED | OUTPATIENT
Start: 2019-09-09 | End: 2020-05-30

## 2019-09-09 RX ORDER — KETAMINE HCL IN NACL, ISO-OSM 100MG/10ML
SYRINGE (ML) INJECTION AS NEEDED
Status: DISCONTINUED | OUTPATIENT
Start: 2019-09-09 | End: 2019-09-09 | Stop reason: SURG

## 2019-09-09 RX ORDER — GLYCOPYRROLATE 0.2 MG/ML
INJECTION INTRAMUSCULAR; INTRAVENOUS AS NEEDED
Status: DISCONTINUED | OUTPATIENT
Start: 2019-09-09 | End: 2019-09-09 | Stop reason: SURG

## 2019-09-09 RX ADMIN — PROPOFOL 20 MG: 10 INJECTION, EMULSION INTRAVENOUS at 12:04

## 2019-09-09 RX ADMIN — PROPOFOL 20 MG: 10 INJECTION, EMULSION INTRAVENOUS at 12:01

## 2019-09-09 RX ADMIN — PROPOFOL 20 MG: 10 INJECTION, EMULSION INTRAVENOUS at 12:03

## 2019-09-09 RX ADMIN — GLYCOPYRROLATE 0.1 MG: 0.2 INJECTION, SOLUTION INTRAMUSCULAR; INTRAVENOUS at 11:55

## 2019-09-09 RX ADMIN — LIDOCAINE HYDROCHLORIDE 80 MG: 10 INJECTION, SOLUTION INFILTRATION; PERINEURAL at 11:59

## 2019-09-09 RX ADMIN — SODIUM CHLORIDE, SODIUM LACTATE, POTASSIUM CHLORIDE, AND CALCIUM CHLORIDE 125 ML/HR: .6; .31; .03; .02 INJECTION, SOLUTION INTRAVENOUS at 10:39

## 2019-09-09 RX ADMIN — PROPOFOL 80 MG: 10 INJECTION, EMULSION INTRAVENOUS at 11:59

## 2019-09-09 RX ADMIN — Medication 20 MG: at 11:59

## 2019-09-09 NOTE — TELEPHONE ENCOUNTER
Dr Yousuf Mcgrath - Patient called [facility] did not receive prescription  Patient had procedure today - Dr Yousuf Mcgrath was to send   Please call patient at [phone] ty

## 2019-09-09 NOTE — H&P
History and Physical - [facility]  Vidhya Villagomez 61 y o  male MRN: [id number]      HPI: Vidhya Villagomez is a 61y o  year old male who presents for cirrhosis, portal hypertension, esophageal variceal bleed in 2019, status post banding  This is a relook for additional varices      REVIEW OF SYSTEMS: Per the HPI, and otherwise unremarkable  Historical Information   Past Medical History:   Diagnosis Date    Alcohol abuse     Arthritis     Cirrhosis ([address] )     COPD (chronic obstructive pulmonary disease) (HCC)     states has it    DVT of proximal lower limb (HCC)     left calf    Hemochromatosis     Psoriasis     Varices, esophageal (HCC)      Past Surgical History:   Procedure Laterality Date    CATARACT EXTRACTION      COLONOSCOPY      CYST REMOVAL      JOINT REPLACEMENT Bilateral     RHINOPLASTY      TOTAL HIP ARTHROPLASTY       Social History   Social History     Substance and Sexual Activity   Alcohol Use Not Currently    Frequency: 4 or more times a week    Drinks per session: 7 to 9     Social History     Substance and Sexual Activity   Drug Use Not Currently     Social History     Tobacco Use   Smoking Status Current Every Day Smoker    Packs/day: 0 00    Years: 50 00    Pack years: 0 00    Last attempt to quit: 2019    Years since quittin 2   Smokeless Tobacco Never Used     Family History   Problem Relation Age of Onset    Aneurysm Mother     Alzheimer's disease Father     Lung cancer Son        Meds/Allergies       (Not in a hospital admission)    Allergies   Allergen Reactions    Cat Hair Extract        Objective     Blood pressure 162/81, pulse 84, temperature 98 2 °F (36 8 °C), temperature source Oral, resp  rate 18, height 6' 3" (1 905 m), weight 104 kg (229 lb 0 9 oz), SpO2 97 %        PHYSICAL EXAM    Gen: NAD  CV: RRR  CHEST: Clear  ABD: soft, NT/ND  EXT: no edema      ASSESSMENT/PLAN:  This is a 61y o  year old male here for Esophagogastroduodenoscopy with possible banding of esophageal varices, and he is stable and optimized for his procedure

## 2019-09-09 NOTE — ANESTHESIA POSTPROCEDURE EVALUATION
Post-Op Assessment Note    CV Status:  Stable    Pain management: adequate     Mental Status:  Awake and alert   Hydration Status:  Euvolemic   PONV Controlled:  Controlled   Airway Patency:  Patent   Post Op Vitals Reviewed: Yes      Staff: CRNA           /84 (09/09/19 1211)    Temp 97 9 °F (36 6 °C) (09/09/19 1211)    Pulse 82 (09/09/19 1211)   Resp 18 (09/09/19 1211)    SpO2 97 % (09/09/19 1211)

## 2019-09-09 NOTE — ANESTHESIA PREPROCEDURE EVALUATION
Review of Systems/Medical History  Patient summary reviewed        Cardiovascular  EKG reviewed, Hypertension , DVT   Pulmonary  COPD ,        GI/Hepatic    Liver disease , alcohol related and cirrhosis,        Negative  ROS        Endo/Other  Negative endo/other ROS      GYN  Negative gynecology ROS          Hematology  Negative hematology ROS      Musculoskeletal    Arthritis     Neurology  Negative neurology ROS      Psychology   Negative psychology ROS              Physical Exam    Airway    Mallampati score: I  TM Distance: >3 FB  Neck ROM: full     Dental       Cardiovascular  Cardiovascular exam normal    Pulmonary  Pulmonary exam normal     Other Findings        Anesthesia Plan  ASA Score- 2     Anesthesia Type- IV sedation with anesthesia with ASA Monitors  Additional Monitors:   Airway Plan:         Plan Factors-    Induction- intravenous  Postoperative Plan-     Informed Consent- Anesthetic plan and risks discussed with patient  I personally reviewed this patient with the CRNA  Discussed and agreed on the Anesthesia Plan with the CRNA  Marisa Sultana

## 2019-09-09 NOTE — DISCHARGE INSTRUCTIONS
Upper Endoscopy   WHAT YOU NEED TO KNOW:   An upper endoscopy is also called an upper gastrointestinal (GI) endoscopy, or an esophagogastroduodenoscopy (EGD)  You may feel bloated, gassy, or have some abdominal discomfort after your procedure  Your throat may be sore for 24 to 36 hours  You may burp or pass gas from air that is still inside your body  DISCHARGE INSTRUCTIONS:   Call 911 for any of the following:   · You have sudden chest pain or trouble breathing  Seek care immediately if:   · You feel dizzy or faint  · You have trouble swallowing  · Your bowel movements are very dark or black  · Your abdomen is hard and firm and you have severe pain  · You vomit blood  Contact your healthcare provider if:   · You feel full or bloated and cannot burp or pass gas  · You have not had a bowel movement for 3 days after your procedure  · You have neck pain  · You have a fever or chills  · You have nausea or are vomiting  · You have a rash or hives  · You have questions or concerns about your endoscopy  Relieve a sore throat:  Suck on throat lozenges or crushed ice  Gargle with a small amount of warm salt water  Mix 1 teaspoon of salt and 1 cup of warm water to make salt water  Relieve gas and discomfort from bloating:  Lie on your right side with a heating pad on your abdomen  Take short walks to help pass gas  Eat small meals until bloating is relieved  Rest after your procedure: You have been given medicine to relax you  Do not  drive or make important decisions until the day after your procedure  Return to your normal activity as directed  You can usually return to work the day after your procedure  Follow up with your healthcare provider as directed:  Write down your questions so you remember to ask them during your visits     © 2017 [address] is for End User's use only and may not be sold, redistributed or otherwise used for commercial purposes  All illustrations and images included in CareNotes® are the copyrighted property of A D A M , Inc  or [employer]  The above information is an  only  It is not intended as medical advice for individual conditions or treatments  Talk to your doctor, nurse or pharmacist before following any medical regimen to see if it is safe and effective for you

## 2019-09-10 RX ORDER — PANTOPRAZOLE SODIUM 40 MG/1
40 TABLET, DELAYED RELEASE ORAL 2 TIMES DAILY
Qty: 180 TABLET | Refills: 3 | Status: SHIPPED | OUTPATIENT
Start: 2019-09-10 | End: 2020-05-30

## 2019-09-10 NOTE — TELEPHONE ENCOUNTER
Pt has no pharmacy listed in chart as a preferred pharmacy    [facility]   or [facility]    Will need to call pt to find out what pharmacy to choose  Pantoprazole 40 mg tablet to be taken daily Dr Allison Martinez put in but it looks as though it came as a print and did not go to any pharmacy      Routing to self as reminder to call after 8am

## 2020-05-30 ENCOUNTER — APPOINTMENT (EMERGENCY)
Dept: RADIOLOGY | Facility: HOSPITAL | Age: 65
DRG: 871 | End: 2020-05-30
Payer: COMMERCIAL

## 2020-05-30 ENCOUNTER — HOSPITAL ENCOUNTER (INPATIENT)
Facility: HOSPITAL | Age: 65
LOS: 4 days | Discharge: NURSING, REHAB OR LONG-TERM CARE | DRG: 871 | End: 2020-06-03
Attending: EMERGENCY MEDICINE | Admitting: FAMILY MEDICINE
Payer: COMMERCIAL

## 2020-05-30 ENCOUNTER — APPOINTMENT (EMERGENCY)
Dept: CT IMAGING | Facility: HOSPITAL | Age: 65
DRG: 871 | End: 2020-05-30
Payer: COMMERCIAL

## 2020-05-30 DIAGNOSIS — R77.8 ELEVATED TROPONIN: ICD-10-CM

## 2020-05-30 DIAGNOSIS — A41.9 SEPSIS (HCC): ICD-10-CM

## 2020-05-30 DIAGNOSIS — K70.30 ALCOHOLIC CIRRHOSIS OF LIVER WITHOUT ASCITES (HCC): ICD-10-CM

## 2020-05-30 DIAGNOSIS — N17.9 ACUTE KIDNEY INJURY (HCC): ICD-10-CM

## 2020-05-30 DIAGNOSIS — K74.60 CIRRHOSIS (HCC): ICD-10-CM

## 2020-05-30 DIAGNOSIS — N39.0 URINARY TRACT INFECTION: Primary | ICD-10-CM

## 2020-05-30 PROBLEM — E72.20 HYPERAMMONEMIA (HCC): Status: ACTIVE | Noted: 2020-05-30

## 2020-05-30 PROBLEM — R33.9 URINARY RETENTION: Status: ACTIVE | Noted: 2020-05-30

## 2020-05-30 PROBLEM — E80.6 HYPERBILIRUBINEMIA: Status: ACTIVE | Noted: 2020-05-30

## 2020-05-30 LAB
ABO GROUP BLD: NORMAL
ALBUMIN SERPL BCP-MCNC: 2.6 G/DL (ref 3.5–5)
ALP SERPL-CCNC: 87 U/L (ref 46–116)
ALT SERPL W P-5'-P-CCNC: 43 U/L (ref 12–78)
AMMONIA PLAS-SCNC: 66 UMOL/L (ref 11–35)
ANION GAP SERPL CALCULATED.3IONS-SCNC: 12 MMOL/L (ref 4–13)
APTT PPP: 36 SECONDS (ref 23–37)
AST SERPL W P-5'-P-CCNC: 77 U/L (ref 5–45)
ATRIAL RATE: 99 BPM
BACTERIA UR QL AUTO: ABNORMAL /HPF
BASOPHILS # BLD AUTO: 0.04 THOUSANDS/ÂΜL (ref 0–0.1)
BASOPHILS NFR BLD AUTO: 1 % (ref 0–1)
BILIRUB DIRECT SERPL-MCNC: 1.73 MG/DL (ref 0–0.2)
BILIRUB SERPL-MCNC: 4 MG/DL (ref 0.2–1)
BILIRUB UR QL STRIP: NEGATIVE
BLD GP AB SCN SERPL QL: NEGATIVE
BUN SERPL-MCNC: 19 MG/DL (ref 5–25)
CALCIUM SERPL-MCNC: 8.1 MG/DL (ref 8.3–10.1)
CHLORIDE SERPL-SCNC: 100 MMOL/L (ref 100–108)
CK MB SERPL-MCNC: 1.3 NG/ML (ref 0–5)
CK MB SERPL-MCNC: <1 % (ref 0–2.5)
CK SERPL-CCNC: 349 U/L (ref 39–308)
CLARITY UR: ABNORMAL
CO2 SERPL-SCNC: 23 MMOL/L (ref 21–32)
COLOR UR: ABNORMAL
CREAT SERPL-MCNC: 1.82 MG/DL (ref 0.6–1.3)
EOSINOPHIL # BLD AUTO: 0.01 THOUSAND/ÂΜL (ref 0–0.61)
EOSINOPHIL NFR BLD AUTO: 0 % (ref 0–6)
ERYTHROCYTE [DISTWIDTH] IN BLOOD BY AUTOMATED COUNT: 14.7 % (ref 11.6–15.1)
GFR SERPL CREATININE-BSD FRML MDRD: 38 ML/MIN/1.73SQ M
GLUCOSE SERPL-MCNC: 109 MG/DL (ref 65–140)
GLUCOSE UR STRIP-MCNC: NEGATIVE MG/DL
HCT VFR BLD AUTO: 35.7 % (ref 36.5–49.3)
HGB BLD-MCNC: 12.5 G/DL (ref 12–17)
HGB UR QL STRIP.AUTO: ABNORMAL
HYALINE CASTS #/AREA URNS LPF: ABNORMAL /LPF
IMM GRANULOCYTES # BLD AUTO: 0.05 THOUSAND/UL (ref 0–0.2)
IMM GRANULOCYTES NFR BLD AUTO: 1 % (ref 0–2)
INR PPP: 1.57 (ref 0.84–1.19)
KETONES UR STRIP-MCNC: NEGATIVE MG/DL
LACTATE SERPL-SCNC: 2 MMOL/L (ref 0.5–2)
LACTATE SERPL-SCNC: 3.5 MMOL/L (ref 0.5–2)
LACTATE SERPL-SCNC: 5.8 MMOL/L (ref 0.5–2)
LEUKOCYTE ESTERASE UR QL STRIP: ABNORMAL
LYMPHOCYTES # BLD AUTO: 0.55 THOUSANDS/ÂΜL (ref 0.6–4.47)
LYMPHOCYTES NFR BLD AUTO: 10 % (ref 14–44)
MCH RBC QN AUTO: 38 PG (ref 26.8–34.3)
MCHC RBC AUTO-ENTMCNC: 35 G/DL (ref 31.4–37.4)
MCV RBC AUTO: 109 FL (ref 82–98)
MONOCYTES # BLD AUTO: 0.36 THOUSAND/ÂΜL (ref 0.17–1.22)
MONOCYTES NFR BLD AUTO: 6 % (ref 4–12)
NEUTROPHILS # BLD AUTO: 4.81 THOUSANDS/ÂΜL (ref 1.85–7.62)
NEUTS SEG NFR BLD AUTO: 82 % (ref 43–75)
NITRITE UR QL STRIP: POSITIVE
NON-SQ EPI CELLS URNS QL MICRO: ABNORMAL /HPF
NRBC BLD AUTO-RTO: 0 /100 WBCS
OTHER STN SPEC: ABNORMAL
P AXIS: 60 DEGREES
PH UR STRIP.AUTO: 6 [PH]
PLATELET # BLD AUTO: 42 THOUSANDS/UL (ref 149–390)
PLATELET # BLD AUTO: 58 THOUSANDS/UL (ref 149–390)
PMV BLD AUTO: 10.1 FL (ref 8.9–12.7)
PMV BLD AUTO: 9.8 FL (ref 8.9–12.7)
POTASSIUM SERPL-SCNC: 3.6 MMOL/L (ref 3.5–5.3)
PR INTERVAL: 150 MS
PROCALCITONIN SERPL-MCNC: 6.59 NG/ML
PROT SERPL-MCNC: 6.5 G/DL (ref 6.4–8.2)
PROT UR STRIP-MCNC: ABNORMAL MG/DL
PROTHROMBIN TIME: 18.8 SECONDS (ref 11.6–14.5)
QRS AXIS: 39 DEGREES
QRSD INTERVAL: 90 MS
QT INTERVAL: 360 MS
QTC INTERVAL: 462 MS
RBC # BLD AUTO: 3.29 MILLION/UL (ref 3.88–5.62)
RBC #/AREA URNS AUTO: ABNORMAL /HPF
RH BLD: POSITIVE
SARS-COV-2 RNA RESP QL NAA+PROBE: NEGATIVE
SODIUM SERPL-SCNC: 135 MMOL/L (ref 136–145)
SP GR UR STRIP.AUTO: <=1.005 (ref 1–1.03)
SPECIMEN EXPIRATION DATE: NORMAL
T WAVE AXIS: 77 DEGREES
TROPONIN I SERPL-MCNC: 0.16 NG/ML
TROPONIN I SERPL-MCNC: 0.17 NG/ML
TROPONIN I SERPL-MCNC: 0.19 NG/ML
TROPONIN I SERPL-MCNC: 0.2 NG/ML
UROBILINOGEN UR QL STRIP.AUTO: 1 E.U./DL
VENTRICULAR RATE: 99 BPM
WBC # BLD AUTO: 5.82 THOUSAND/UL (ref 4.31–10.16)
WBC #/AREA URNS AUTO: ABNORMAL /HPF

## 2020-05-30 PROCEDURE — 85730 THROMBOPLASTIN TIME PARTIAL: CPT | Performed by: EMERGENCY MEDICINE

## 2020-05-30 PROCEDURE — 87449 NOS EACH ORGANISM AG IA: CPT | Performed by: NURSE PRACTITIONER

## 2020-05-30 PROCEDURE — 82140 ASSAY OF AMMONIA: CPT | Performed by: EMERGENCY MEDICINE

## 2020-05-30 PROCEDURE — 96360 HYDRATION IV INFUSION INIT: CPT

## 2020-05-30 PROCEDURE — 86900 BLOOD TYPING SEROLOGIC ABO: CPT | Performed by: EMERGENCY MEDICINE

## 2020-05-30 PROCEDURE — 71046 X-RAY EXAM CHEST 2 VIEWS: CPT

## 2020-05-30 PROCEDURE — 84145 PROCALCITONIN (PCT): CPT | Performed by: EMERGENCY MEDICINE

## 2020-05-30 PROCEDURE — 87077 CULTURE AEROBIC IDENTIFY: CPT | Performed by: EMERGENCY MEDICINE

## 2020-05-30 PROCEDURE — 93005 ELECTROCARDIOGRAM TRACING: CPT

## 2020-05-30 PROCEDURE — 87635 SARS-COV-2 COVID-19 AMP PRB: CPT | Performed by: EMERGENCY MEDICINE

## 2020-05-30 PROCEDURE — 83605 ASSAY OF LACTIC ACID: CPT | Performed by: FAMILY MEDICINE

## 2020-05-30 PROCEDURE — 82553 CREATINE MB FRACTION: CPT | Performed by: EMERGENCY MEDICINE

## 2020-05-30 PROCEDURE — 85049 AUTOMATED PLATELET COUNT: CPT | Performed by: FAMILY MEDICINE

## 2020-05-30 PROCEDURE — 86850 RBC ANTIBODY SCREEN: CPT | Performed by: EMERGENCY MEDICINE

## 2020-05-30 PROCEDURE — 85610 PROTHROMBIN TIME: CPT | Performed by: EMERGENCY MEDICINE

## 2020-05-30 PROCEDURE — 99223 1ST HOSP IP/OBS HIGH 75: CPT | Performed by: FAMILY MEDICINE

## 2020-05-30 PROCEDURE — 87186 SC STD MICRODIL/AGAR DIL: CPT | Performed by: FAMILY MEDICINE

## 2020-05-30 PROCEDURE — 82248 BILIRUBIN DIRECT: CPT | Performed by: FAMILY MEDICINE

## 2020-05-30 PROCEDURE — 82550 ASSAY OF CK (CPK): CPT | Performed by: EMERGENCY MEDICINE

## 2020-05-30 PROCEDURE — 84484 ASSAY OF TROPONIN QUANT: CPT | Performed by: FAMILY MEDICINE

## 2020-05-30 PROCEDURE — 93010 ELECTROCARDIOGRAM REPORT: CPT | Performed by: INTERNAL MEDICINE

## 2020-05-30 PROCEDURE — 87077 CULTURE AEROBIC IDENTIFY: CPT | Performed by: FAMILY MEDICINE

## 2020-05-30 PROCEDURE — 36415 COLL VENOUS BLD VENIPUNCTURE: CPT | Performed by: EMERGENCY MEDICINE

## 2020-05-30 PROCEDURE — 84484 ASSAY OF TROPONIN QUANT: CPT | Performed by: EMERGENCY MEDICINE

## 2020-05-30 PROCEDURE — 96361 HYDRATE IV INFUSION ADD-ON: CPT

## 2020-05-30 PROCEDURE — 83605 ASSAY OF LACTIC ACID: CPT | Performed by: EMERGENCY MEDICINE

## 2020-05-30 PROCEDURE — 81001 URINALYSIS AUTO W/SCOPE: CPT | Performed by: FAMILY MEDICINE

## 2020-05-30 PROCEDURE — 87186 SC STD MICRODIL/AGAR DIL: CPT | Performed by: EMERGENCY MEDICINE

## 2020-05-30 PROCEDURE — 85025 COMPLETE CBC W/AUTO DIFF WBC: CPT | Performed by: EMERGENCY MEDICINE

## 2020-05-30 PROCEDURE — 74177 CT ABD & PELVIS W/CONTRAST: CPT

## 2020-05-30 PROCEDURE — 99285 EMERGENCY DEPT VISIT HI MDM: CPT | Performed by: EMERGENCY MEDICINE

## 2020-05-30 PROCEDURE — 87040 BLOOD CULTURE FOR BACTERIA: CPT | Performed by: EMERGENCY MEDICINE

## 2020-05-30 PROCEDURE — 80053 COMPREHEN METABOLIC PANEL: CPT | Performed by: EMERGENCY MEDICINE

## 2020-05-30 PROCEDURE — 87086 URINE CULTURE/COLONY COUNT: CPT | Performed by: FAMILY MEDICINE

## 2020-05-30 PROCEDURE — 86901 BLOOD TYPING SEROLOGIC RH(D): CPT | Performed by: EMERGENCY MEDICINE

## 2020-05-30 PROCEDURE — 99285 EMERGENCY DEPT VISIT HI MDM: CPT

## 2020-05-30 RX ORDER — SODIUM CHLORIDE 9 MG/ML
125 INJECTION, SOLUTION INTRAVENOUS CONTINUOUS
Status: DISCONTINUED | OUTPATIENT
Start: 2020-05-30 | End: 2020-06-01

## 2020-05-30 RX ORDER — HEPARIN SODIUM 5000 [USP'U]/ML
5000 INJECTION, SOLUTION INTRAVENOUS; SUBCUTANEOUS EVERY 8 HOURS SCHEDULED
Status: DISCONTINUED | OUTPATIENT
Start: 2020-05-30 | End: 2020-05-30

## 2020-05-30 RX ORDER — AZITHROMYCIN 500 MG/1
500 TABLET, FILM COATED ORAL EVERY 24 HOURS
Status: DISCONTINUED | OUTPATIENT
Start: 2020-05-30 | End: 2020-06-03

## 2020-05-30 RX ADMIN — SODIUM CHLORIDE 1000 ML: 0.9 INJECTION, SOLUTION INTRAVENOUS at 13:22

## 2020-05-30 RX ADMIN — SODIUM CHLORIDE 1000 ML: 0.9 INJECTION, SOLUTION INTRAVENOUS at 12:04

## 2020-05-30 RX ADMIN — SODIUM CHLORIDE 125 ML/HR: 0.9 INJECTION, SOLUTION INTRAVENOUS at 15:10

## 2020-05-30 RX ADMIN — IOHEXOL 100 ML: 350 INJECTION, SOLUTION INTRAVENOUS at 13:02

## 2020-05-30 RX ADMIN — CEFTRIAXONE SODIUM 1000 MG: 10 INJECTION, POWDER, FOR SOLUTION INTRAVENOUS at 13:54

## 2020-05-30 RX ADMIN — SODIUM CHLORIDE 1250 ML: 0.9 INJECTION, SOLUTION INTRAVENOUS at 13:54

## 2020-05-30 RX ADMIN — AZITHROMYCIN 500 MG: 500 TABLET, FILM COATED ORAL at 20:54

## 2020-05-30 RX ADMIN — HEPARIN SODIUM 5000 UNITS: 5000 INJECTION INTRAVENOUS; SUBCUTANEOUS at 15:36

## 2020-05-30 NOTE — H&P
H&P- Dwaine Garcia 1955, 59 y o  male MRN: [id number]    Unit/Bed#: -01 Encounter: [id number]    Primary Care Provider: No primary care provider on file  Date and time admitted to hospital: 5/30/2020 11:51 AM        Dysuria  Assessment & Plan  Current presentation consistent with urinary tract infection  No prior history of benign prostatic hyperplasia or urinary retention  Patient reports urinary urgency with urinary retention this morning  Currently, he received 3 L of normal saline and still has not urinated  Will provide with a Arroyo catheter due to the suspicion of urinary retention with worsening renal function due to possibly obstructive causes  Acute kidney injury [facility])  Assessment & Plan  Acute kidney injury with a creatinine of 1 8  Baseline creatinine is 0 7  Will consult nephrology  Likely related to pre or post-renal etiology  Patient has received 3 L of normal saline and will continue with normal saline at 125 cc an hour  Still no urine output  Will provide with Arroyo catheter for urine output assessment  Avoid nephrotoxic agents  Hyperammonemia (HCC)  Assessment & Plan  Noted elevated ammonia level of 66  Patient does not have impaired mentation at this time  Will continue to monitor  Hyperbilirubinemia  Assessment & Plan  Noted elevated bilirubin at 4  Patient is a history of hyperbilirubinemia as high as 3  This is consistent with cirrhosis  Will continue with hydration  Sepsis [facility])  Assessment & Plan  Presents with urinary tract infection, fatigue, lactic acid elevation and acute kidney injury as end-organ damage  Will proceed with Rocephin  Patient is hemodynamically stable and afebrile  Awaiting for urine and blood cultures  Continue hydration with normal saline at 125 cc an hour  First lactic acid was 5 8  Second lactic acid is still pending      Alcoholic cirrhosis ([facility] [address] )  Assessment & Plan  Patient with a history of alcoholic "cirrhosis  Follows with Dr Olga Rain and was last seen about a month ago  Normal LFTs and INR  Slightly elevated ammonia, unknown baseline  Also noted hyperbilirubinemia  Will check direct bilirubin  Patient has a history of hyperbilirubinemia in the past with elevation as high as 3  This is consistent with cirrhosis  Consult GI for further management  Hypertension  Assessment & Plan  Blood pressure is currently stable  Patient does not take any blood pressure medications at home  * Urinary tract infection  Assessment & Plan  Urinary tract infection associated with urinary urgency and generalized fatigue  Noted abnormal findings on the CT scan suggestive of acute cystitis  No hydronephrosis  Lactic acid elevation noted as well as acute kidney injury  Will continue Rocephin  Urinary and blood culture pending  Monitor BMP and CBC  Ordered procalcitonin to assess the range of infection  VTE Prophylaxis: Heparin  / sequential compression device   Code Status: full  POLST: POLST form is not discussed and not completed at this time  Discussion with family:  Patient    Anticipated Length of Stay:  Patient will be admitted on an Inpatient basis with an anticipated length of stay of  at least 2 midnights  Justification for Hospital Stay:  Urosepsis    Total Time for Visit, including Counseling / Coordination of Care: 70 min  Greater than 50% of this total time spent on direct patient counseling and coordination of care  Chief Complaint:   Generalized weakness    History of Present Illness:    Danette Kamara is a 59 y o  male who presents with generalized weakness and dysuria that started this morning   Patient states that he woke up today and he felt \"too weak to get out of the bed\"  He also reports that he has been \"peeing nonstop, but nothing was really coming out\"  He denies any abdominal pain, nausea, chest pain, shortness of breath, fever    He admits to always feel cold and currently " feels chills  He also reports 1 episode of loose stool  Review of Systems:    Review of Systems   Constitutional: Positive for activity change, chills and fatigue  Negative for diaphoresis and fever  Respiratory: Negative for cough and shortness of breath  Cardiovascular: Negative for chest pain and leg swelling  Gastrointestinal: Negative for abdominal pain, nausea and vomiting  Genitourinary: Positive for difficulty urinating, frequency and urgency  Musculoskeletal: Negative for back pain  Skin: Negative for rash  Neurological: Positive for light-headedness  Negative for dizziness  Psychiatric/Behavioral: Negative for behavioral problems  Past Medical and Surgical History:     Past Medical History:   Diagnosis Date   • Alcohol abuse    • Arthritis    • Cirrhosis ([facility] )    • COPD (chronic obstructive pulmonary disease) ([facility] )     states has it   • DVT of proximal lower limb (HCC)     left calf   • Hemochromatosis    • Psoriasis    • Varices, esophageal (HCC)        Past Surgical History:   Procedure Laterality Date   • CATARACT EXTRACTION     • COLONOSCOPY     • CYST REMOVAL     • JOINT REPLACEMENT Bilateral    • RHINOPLASTY     • TOTAL HIP ARTHROPLASTY         Meds/Allergies:    Prior to Admission medications    Medication Sig Start Date End Date Taking? Authorizing Provider   acetaminophen (TYLENOL) 650 mg CR tablet Take 650 mg by mouth every 8 (eight) hours as needed for mild pain   Yes Historical Provider, MD   pantoprazole (PROTONIX) 40 mg tablet Take 1 tablet (40 mg total) by mouth daily 9/9/19 5/30/20  [place] [facility]are,    pantoprazole (PROTONIX) 40 mg tablet Take 1 tablet (40 mg total) by mouth 2 (two) times a day 9/10/19 5/30/20  Sandor Jaime PA-C     I have reviewed home medications with patient personally  Allergies:    Allergies   Allergen Reactions   • Cat Hair Extract        Social History:     Marital Status: /Civil Union   Occupation:  Retired  Patient "Pre-hospital Living Situation:  Lives at home  Patient Pre-hospital Level of Mobility:  Ambulates the cane  Patient Pre-hospital Diet Restrictions:  None  Substance Use History:   Social History     Substance and Sexual Activity   Alcohol Use Not Currently   • Frequency: 4 or more times a week   • Drinks per session: 7 to 9     Social History     Tobacco Use   Smoking Status Current Every Day Smoker   • Packs/day: 0 00   • Years: 50 00   • Pack years: 0 00   • Last attempt to quit: 2019   • Years since quittin 9   Smokeless Tobacco Never Used     Social History     Substance and Sexual Activity   Drug Use Not Currently       Family History:    non-contributory    Physical Exam:     Vitals:   Blood Pressure: 160/88 (20 1451)  Pulse: 92 (20 1451)  Temperature: 97 7 °F (36 5 °C) (20 1156)  Temp Source: Temporal (20 1156)  Respirations: 22 (20 1450)  Height: 6' 3\" (190 5 cm) (20 1441)  Weight - Scale: 106 kg (233 lb 0 4 oz) (20 1441)  SpO2: 96 % (20 1451)    Physical Exam   Constitutional: He appears well-developed and well-nourished  HENT:   Head: Normocephalic and atraumatic  Cardiovascular: Normal rate, regular rhythm and normal heart sounds  Pulmonary/Chest: Effort normal and breath sounds normal  No respiratory distress  Abdominal: Soft  There is no tenderness  Musculoskeletal: He exhibits no edema or deformity  Neurological: He is alert  Skin: Skin is warm  Capillary refill takes less than 2 seconds  No rash noted  No erythema  Psychiatric: He has a normal mood and affect  Additional Data:     Lab Results: I have personally reviewed pertinent reports        Results from last 7 days   Lab Units 20  1202   WBC Thousand/uL 5 82   HEMOGLOBIN g/dL 12 5   HEMATOCRIT % 35 7*   PLATELETS Thousands/uL 58*   NEUTROS PCT % 82*   LYMPHS PCT % 10*   MONOS PCT % 6   EOS PCT % 0     Results from last 7 days   Lab Units 20  1202   SODIUM " mmol/L 135*   POTASSIUM mmol/L 3 6   CHLORIDE mmol/L 100   CO2 mmol/L 23   BUN mg/dL 19   CREATININE mg/dL 1 82*   ANION GAP mmol/L 12   CALCIUM mg/dL 8 1*   ALBUMIN g/dL 2 6*   TOTAL BILIRUBIN mg/dL 4 00*   ALK PHOS U/L 87   ALT U/L 43   AST U/L 77*   GLUCOSE RANDOM mg/dL 109     Results from last 7 days   Lab Units 05/30/20  1202   INR  1 57*             Results from last 7 days   Lab Units 05/30/20  1400 05/30/20  1202   LACTIC ACID mmol/L 3 5* 5 8*       Imaging: I have personally reviewed pertinent reports  CT abdomen pelvis with contrast   Final Result by Ramiro Angelo MD (05/30 1327)      1  Evidence of cystitis with marked bladder wall thickening with left ureteral wall enhancement suggesting ascending urinary tract infection  2   Moderate mesenteric stranding, worsened compared to the previous CT though of uncertain etiology  This may be related to an acute inflammatory process such as mesenteric right as versus venous congestion from portal hypertension  No vascular    thrombosis is visualized, however  3   Cirrhosis with portal hypertension  4   Gallbladder wall thickening likely on the basis of hepatocellular disease  The study was marked in [facility] for immediate notification  Workstation performed: [id number]         XR chest pa & lateral    (Results Pending)       EKG, Pathology, and Other Studies Reviewed on Admission:   EKG:nsr  Allscripts / Epic Records Reviewed: Yes     ** Please Note: This note has been constructed using a voice recognition system   **

## 2020-05-30 NOTE — ASSESSMENT & PLAN NOTE
Presents with urinary tract infection, fatigue, lactic acid elevation and acute kidney injury as end-organ damage  Will proceed with Rocephin  Patient is hemodynamically stable and afebrile  Awaiting for urine and blood cultures  Continue hydration with normal saline at 125 cc an hour  First lactic acid was 5 8  Second lactic acid is still pending

## 2020-05-30 NOTE — ASSESSMENT & PLAN NOTE
Patient with a history of alcoholic cirrhosis  Follows with Dr Olga Rain and was last seen about a month ago  Normal LFTs and INR  Slightly elevated ammonia, unknown baseline  Also noted hyperbilirubinemia  Will check direct bilirubin  Patient has a history of hyperbilirubinemia in the past with elevation as high as 3  This is consistent with cirrhosis  Consult GI for further management

## 2020-05-30 NOTE — ED PROVIDER NOTES
History  Chief Complaint   Patient presents with   • Diarrhea     EMS reports pt was found covered in feces on the bathroom  Pt room mate said pt slept all day yesterday and he found him in the bathroom unable to move  HPI patient is a 70-year-old male, history of cirrhosis COPD, reports felt very weak this morning unable to stand, had essentially crawl around and apparently had difficulty controlling his stool  Patient was found on the floor of his bathroom covered in feces  Patient reports he is unable to stand too weak to get up  He feels exhausted  He reports just feeling washed out and tired  Patient reports he does have cirrhosis from excessive drinking in the past but denies any drinking recently  He reports he felt well yesterday and then became increasingly weak this morning  Denies any recent fever or chills  Denies any injury  Past medical history of alcohol abuse, cirrhosis, COPD, DVT  Family history noncontributory  Social history, reports prior alcoholic but denies any recent drinking  Currently smoker    Prior to Admission Medications   Prescriptions Last Dose Informant Patient Reported?  Taking?   acetaminophen (TYLENOL) 650 mg CR tablet  Self Yes Yes   Sig: Take 650 mg by mouth every 8 (eight) hours as needed for mild pain      Facility-Administered Medications: None       Past Medical History:   Diagnosis Date   • Alcohol abuse    • Arthritis    • Cirrhosis ([facility] [address] )    • COPD (chronic obstructive pulmonary disease) (HCC)     states has it   • DVT of proximal lower limb (HCC)     left calf   • Hemochromatosis    • Psoriasis    • Varices, esophageal (HCC)        Past Surgical History:   Procedure Laterality Date   • CATARACT EXTRACTION     • COLONOSCOPY     • CYST REMOVAL     • JOINT REPLACEMENT Bilateral    • RHINOPLASTY     • TOTAL HIP ARTHROPLASTY         Family History   Problem Relation Age of Onset   • Aneurysm Mother    • Alzheimer's disease Father    • Lung cancer Son      I have reviewed and agree with the history as documented  E-Cigarette/Vaping     E-Cigarette/Vaping Substances     Social History     Tobacco Use   • Smoking status: Current Every Day Smoker     Packs/day: 0 00     Years: 50 00     Pack years: 0 00     Last attempt to quit: 2019     Years since quittin 9   • Smokeless tobacco: Never Used   Substance Use Topics   • Alcohol use: Not Currently     Frequency: 4 or more times a week     Drinks per session: 7 to 9   • Drug use: Not Currently       Review of Systems   Constitutional: Positive for fatigue  Negative for diaphoresis and fever  HENT: Negative for congestion, ear pain, nosebleeds and sore throat  Eyes: Negative for photophobia, pain, discharge and visual disturbance  Respiratory: Negative for cough, choking, chest tightness, shortness of breath and wheezing  Cardiovascular: Negative for chest pain and palpitations  Gastrointestinal: Positive for diarrhea  Negative for abdominal distention, abdominal pain and vomiting  Genitourinary: Negative for dysuria, flank pain and frequency  Musculoskeletal: Negative for back pain, gait problem and joint swelling  Skin: Negative for color change and rash  Neurological: Positive for weakness  Negative for dizziness, syncope and headaches  Psychiatric/Behavioral: Negative for behavioral problems and confusion  The patient is not nervous/anxious  All other systems reviewed and are negative  Physical Exam  Physical Exam   Constitutional: He is oriented to person, place, and time  He appears well-developed and well-nourished  HENT:   Head: Normocephalic  Right Ear: External ear normal    Left Ear: External ear normal    Nose: Nose normal    Mouth/Throat: Oropharynx is clear and moist    Eyes: Pupils are equal, round, and reactive to light  EOM and lids are normal    Neck: Normal range of motion  Neck supple     Cardiovascular: Normal rate, regular rhythm, normal heart sounds and intact distal pulses  Pulmonary/Chest: Effort normal and breath sounds normal  No respiratory distress  Abdominal: Soft  He exhibits distension  Ascitic feeling abdomen, fluid wave, diffusely swollen but now obvious tenderness   Musculoskeletal: Normal range of motion  He exhibits no deformity  Neurological: He is alert and oriented to person, place, and time  Skin: Skin is warm and dry  Psychiatric: He has a normal mood and affect  Nursing note and vitals reviewed     Pulse oximetry 93% on room air adequate oxygenation there is no hypoxia    Vital Signs  ED Triage Vitals   Temperature Pulse Respirations Blood Pressure SpO2   05/30/20 1156 05/30/20 1154 05/30/20 1154 05/30/20 1154 05/30/20 1154   97 7 °F (36 5 °C) 90 18 106/53 93 %      Temp Source Heart Rate Source Patient Position - Orthostatic VS BP Location FiO2 (%)   05/30/20 1156 05/30/20 1154 -- 05/30/20 1154 --   Temporal Monitor  Right arm       Pain Score       --                  Vitals:    05/30/20 1154 05/30/20 1400 05/30/20 1450 05/30/20 1451   BP: 106/53 145/73 160/88 160/88   Pulse: 90 90 94 92         Visual Acuity      ED Medications  Medications   cefTRIAXone (ROCEPHIN) 1,000 mg in dextrose 5 % 50 mL IVPB (has no administration in time range)   sodium chloride 0 9 % infusion (125 mL/hr Intravenous New Bag 5/30/20 1510)   nicotine (NICODERM CQ) 7 mg/24hr TD 24 hr patch 1 patch (1 patch Transdermal Not Given 5/30/20 1509)   heparin (porcine) subcutaneous injection 5,000 Units (has no administration in time range)   sodium chloride 0 9 % bolus 1,000 mL (0 mL Intravenous Stopped 5/30/20 1312)   iohexol (OMNIPAQUE) 350 MG/ML injection (MULTI-DOSE) 100 mL (100 mL Intravenous Given 5/30/20 1302)   sodium chloride 0 9 % bolus 1,000 mL (0 mL Intravenous Stopped 5/30/20 1508)   ceftriaxone (ROCEPHIN) 1 g/50 mL in dextrose IVPB (0 mg Intravenous Stopped 5/30/20 1414)   sodium chloride 0 9 % bolus 1,250 mL (0 mL Intravenous Stopped 5/30/20 1508) "      Diagnostic Studies  Results Reviewed     Procedure Component Value Units Date/Time    Troponin I [[id number]] Collected:  05/30/20 1517    Lab Status: In process Specimen:  Blood from Hand, Right Updated:  05/30/20 1521    Bilirubin, direct [[id number]]  (Abnormal) Collected:  05/30/20 1202    Lab Status:  Final result Specimen:  Blood from Arm, Right Updated:  05/30/20 1519     Bilirubin, Direct 1 73 mg/dL     Lactic acid 2 Hours [[id number]]  (Abnormal) Collected:  05/30/20 1400    Lab Status:  Final result Specimen:  Blood from Arm, Right Updated:  05/30/20 1504     LACTIC ACID 3 5 mmol/L     Narrative:       Result may be elevated if tourniquet was used during collection  CKMB [[id number]]  (Normal) Collected:  05/30/20 1202    Lab Status:  Final result Specimen:  Blood from Arm, Right Updated:  05/30/20 1454     CK-MB Index <1 0 %      CK-MB 1 3 ng/mL     CK [[id number]]  (Abnormal) Collected:  05/30/20 1202    Lab Status:  Final result Specimen:  Blood from Arm, Right Updated:  05/30/20 1452     Total  U/L     Novel Coronavirus (Covid-19),PCR [facility] [[id number]]  (Normal) Collected:  05/30/20 1247    Lab Status:  Final result Specimen:  Nares from Nose Updated:  05/30/20 1345     SARS-CoV-2 Negative    Narrative: The specimen collection materials, transport medium, and/or testing methodology utilized in the production of these test results have been proven to be reliable in a limited validation with an abbreviated program under the Emergency Utilization Authorization provided by the FDA  Testing reported as \"Presumptive positive\" will be confirmed with secondary testing with a reference laboratory to ensure result accuracy  Clinical caution and judgement should be used with the interpretation of these results with consideration of the clinical impression and other laboratory testing    Testing reported as \"Positive\" or \"Negative\" has been proven to be accurate according to standard laboratory " validation requirements  All testing is performed with control materials showing appropriate reactivity at standard intervals  CBC and differential [[id number]]  (Abnormal) Collected:  05/30/20 1202    Lab Status:  Final result Specimen:  Blood from Arm, Right Updated:  05/30/20 1248     WBC 5 82 Thousand/uL      RBC 3 29 Million/uL      Hemoglobin 12 5 g/dL      Hematocrit 35 7 %       fL      MCH 38 0 pg      MCHC 35 0 g/dL      RDW 14 7 %      MPV 9 8 fL      Platelets 58 Thousands/uL      nRBC 0 /100 WBCs      Neutrophils Relative 82 %      Immat GRANS % 1 %      Lymphocytes Relative 10 %      Monocytes Relative 6 %      Eosinophils Relative 0 %      Basophils Relative 1 %      Neutrophils Absolute 4 81 Thousands/µL      Immature Grans Absolute 0 05 Thousand/uL      Lymphocytes Absolute 0 55 Thousands/µL      Monocytes Absolute 0 36 Thousand/µL      Eosinophils Absolute 0 01 Thousand/µL      Basophils Absolute 0 04 Thousands/µL     Lactic acid [[id number]]  (Abnormal) Collected:  05/30/20 1202    Lab Status:  Final result Specimen:  Blood from Arm, Right Updated:  05/30/20 1243     LACTIC ACID 5 8 mmol/L     Narrative:       Result may be elevated if tourniquet was used during collection      Troponin I [[id number]]  (Abnormal) Collected:  05/30/20 1202    Lab Status:  Final result Specimen:  Blood from Arm, Right Updated:  05/30/20 1241     Troponin I 0 16 ng/mL     Protime-INR [[id number]]  (Abnormal) Collected:  05/30/20 1202    Lab Status:  Final result Specimen:  Blood from Arm, Right Updated:  05/30/20 1241     Protime 18 8 seconds      INR 1 57    APTT [[id number]]  (Normal) Collected:  05/30/20 1202    Lab Status:  Final result Specimen:  Blood from Arm, Right Updated:  05/30/20 1241     PTT 36 seconds     Clostridium difficile toxin by PCR with EIA [[id number]]     Lab Status:  No result Specimen:  Stool from Per Rectum     Comprehensive metabolic panel [[id number]]  (Abnormal) Collected:  05/30/20 1202    Lab Status:  Final result Specimen:  Blood from Arm, Right Updated:  05/30/20 1232     Sodium 135 mmol/L      Potassium 3 6 mmol/L      Chloride 100 mmol/L      CO2 23 mmol/L      ANION GAP 12 mmol/L      BUN 19 mg/dL      Creatinine 1 82 mg/dL      Glucose 109 mg/dL      Calcium 8 1 mg/dL      AST 77 U/L      ALT 43 U/L      Alkaline Phosphatase 87 U/L      Total Protein 6 5 g/dL      Albumin 2 6 g/dL      Total Bilirubin 4 00 mg/dL      eGFR 38 ml/min/1 73sq m     Narrative:       [place] guidelines for Chronic Kidney Disease (CKD):   •  Stage 1 with normal or high GFR (GFR > 90 mL/min/1 73 square meters)  •  Stage 2 Mild CKD (GFR = 60-89 mL/min/1 73 square meters)  •  Stage 3A Moderate CKD (GFR = 45-59 mL/min/1 73 square meters)  •  Stage 3B Moderate CKD (GFR = 30-44 mL/min/1 73 square meters)  •  Stage 4 Severe CKD (GFR = 15-29 mL/min/1 73 square meters)  •  Stage 5 End Stage CKD (GFR <15 mL/min/1 73 square meters)  Note: GFR calculation is accurate only with a steady state creatinine    Ammonia [[id number]]  (Abnormal) Collected:  05/30/20 1202    Lab Status:  Final result Specimen:  Blood from Arm, Right Updated:  05/30/20 1229     Ammonia 66 umol/L     Blood culture #2 [[id number]] Collected:  05/30/20 1216    Lab Status: In process Specimen:  Blood from Arm, Left Updated:  05/30/20 1219    Procalcitonin [[id number]] Collected:  05/30/20 1202    Lab Status: In process Specimen:  Blood from Arm, Right Updated:  05/30/20 1209    Blood culture #1 [[id number]] Collected:  05/30/20 1202    Lab Status: In process Specimen:  Blood from Arm, Right Updated:  05/30/20 1208    UA w Reflex to Microscopic w Reflex to Culture [[id number]]     Lab Status:  No result Specimen:  Urine                  CT abdomen pelvis with contrast   Final Result by Hannah Borrego MD (05/30 1327)      1    Evidence of cystitis with marked bladder wall thickening with left ureteral wall enhancement suggesting ascending urinary tract infection  2   Moderate mesenteric stranding, worsened compared to the previous CT though of uncertain etiology  This may be related to an acute inflammatory process such as mesenteric right as versus venous congestion from portal hypertension  No vascular    thrombosis is visualized, however  3   Cirrhosis with portal hypertension  4   Gallbladder wall thickening likely on the basis of hepatocellular disease  The study was marked in [facility] for immediate notification  Workstation performed: [id number]         XR chest pa & lateral    (Results Pending)              Procedures  ECG 12 Lead Documentation Only  Date/Time: 5/30/2020 12:49 PM  Performed by: Alexa Alarcon MD  Authorized by: Alexa Alarcon MD     Indications / Diagnosis:  Abdominal pain distention  ECG reviewed by me, the ED Provider: yes    Patient location:  ED  Previous ECG:     Previous ECG:  Compared to current    Comparison ECG info:  June 22, 2019    Similarity:  Changes noted  Interpretation:     Interpretation: non-specific    Rate:     ECG rate:  Ninety-nine  Rhythm:     Rhythm: sinus rhythm    Comments:      Some baseline artifact, normal sinus rhythm no acute ST-T wave changes             ED Course                Chest x-ray: Chest x-ray showed a normal cardiac silhouette, no pneumothorax no infiltrates, No sign of pathology, interpreted by me, I was the primary   other diagnostic testing showed a elevated lactate at 5 8 possibly consistent with patient's cirrhosis, dehydration, but also concerned about a source for sepsis  Cardiac troponin was elevated at 0 16 patient has no chest pain there are no acute EKG changes  Electrolytes showed a BUN creatinine BUN was 19 creatinine was 1 82    Initial Sepsis Screening     Row Name 05/30/20 9327                Is the patient's history suggestive of a new or worsening infection?   (!) Yes (Proceed)  -PF        Suspected source of infection urinary tract infection  -PF        Are two or more of the following signs & symptoms of infection both present and new to the patient? No  -PF        Indicate SIRS criteria  —        If the answer is yes to both questions, suspicion of sepsis is present  —        If severe sepsis is present AND tissue hypoperfusion perists in the hour after fluid resuscitation or lactate > 4, the patient meets criteria for SEPTIC SHOCK  —        Are any of the following organ dysfunction criteria present within 6 hours of suspected infection and SIRS criteria that are NOT considered to be chronic conditions?  —        Organ dysfunction  —        Date of presentation of severe sepsis  —        Time of presentation of severe sepsis  —        Tissue hypoperfusion persists in the hour after crystalloid fluid administration, evidenced, by either:  —        Was hypotension present within one hour of the conclusion of crystalloid fluid administration?  —        Date of presentation of septic shock  —        Time of presentation of septic shock  —          User Key  (r) = Recorded By, (t) = Taken By, (c) = Cosigned By    [address] Name Provider Type    PF Zaria Myers MD Physician         CT showed evidence of cystitis with marked bladder wall thickening, there is some stranding in the bowel  Spoke with hospitalist service, discussed treatment with antibiotics will treat with Rocephin for the urinary tract infection seen on CT scan  Patient does have an elevated lactate, will repeat the lactate as per sepsis protocol  Patient had an elevated troponin will repeat the troponin, will trend  Patient has no chest pain  There are no acute EKG changes  repeat lactate 3 5, improved but still elevated continue to hydrate; at time of re-evaluation patient was taken to the floor already              MDM medical decision making 79-year-old male presents emergency department with weakness, diarrhea, decreased urination, CT consistent with urinary tract infection  Patient was evaluated as a sepsis patient, lactate was elevated treated with IV fluids and IV antibiotics  Discussed with hospitalist service will accept  Disposition  Final diagnoses:   Urinary tract infection   [facility]   Cirrhosis ([facility] )   Acute kidney injury ([facility] )     Time reflects when diagnosis was documented in both MDM as applicable and the Disposition within this note     Time User Action Codes Description Comment    5/30/2020  1:49 PM Rhesa Pinal Add [N39 0] Urinary tract infection     5/30/2020  2:02 PM Katherene Rhyme [A41 9] Sepsis ([facility] )     5/30/2020  2:02 PM Katherene Rhyme [K74 60] Cirrhosis ([facility] )     5/30/2020  2:02 PM Rhesa Pinal Add [N17 9] Acute kidney injury ([facility] )     5/30/2020  2:36 PM Verlie Patient Add [I23 23] Alcoholic cirrhosis of liver without ascites [facility])       ED Disposition     ED Disposition Condition Date/Time Comment    Admit Stable Sat May 30, 2020  1:50 PM Case was discussed with hospitalist service  and the patient's admission status was agreed to be 2 midnights the service of Dr Dede Valle    None         Current Discharge Medication List      CONTINUE these medications which have NOT CHANGED    Details   acetaminophen (TYLENOL) 650 mg CR tablet Take 650 mg by mouth every 8 (eight) hours as needed for mild pain           No discharge procedures on file      PDMP Review     None          ED Provider  Electronically Signed by           Jose Ramon Henry MD  05/30/20 5102

## 2020-05-30 NOTE — ASSESSMENT & PLAN NOTE
Urinary tract infection associated with urinary urgency and generalized fatigue  Noted abnormal findings on the CT scan suggestive of acute cystitis  No hydronephrosis  Lactic acid elevation noted as well as acute kidney injury  Will continue Rocephin  Urinary and blood culture pending  Monitor BMP and CBC  Ordered procalcitonin to assess the range of infection

## 2020-05-30 NOTE — ASSESSMENT & PLAN NOTE
Current presentation consistent with urinary tract infection  No prior history of benign prostatic hyperplasia or urinary retention  Patient reports urinary urgency with urinary retention this morning  Currently, he received 3 L of normal saline and still has not urinated  Will provide with a [place] catheter due to the suspicion of urinary retention with worsening renal function due to possibly obstructive causes

## 2020-05-30 NOTE — ASSESSMENT & PLAN NOTE
Noted elevated bilirubin at 4  Patient is a history of hyperbilirubinemia as high as 3  This is consistent with cirrhosis  Will continue with hydration

## 2020-05-30 NOTE — PLAN OF CARE
Problem: Potential for Falls  Goal: Patient will remain free of falls  Description  INTERVENTIONS:  - Assess patient frequently for physical needs  -  Identify cognitive and physical deficits and behaviors that affect risk of falls    -  [place] fall precautions as indicated by assessment   - Educate patient/family on patient safety including physical limitations  - Instruct patient to call for assistance with activity based on assessment  - Modify environment to reduce risk of injury  - Consider OT/PT consult to assist with strengthening/mobility  Outcome: Progressing     Problem: Prexisting or High Potential for Compromised Skin Integrity  Goal: Skin integrity is maintained or improved  Description  INTERVENTIONS:  - Identify patients at risk for skin breakdown  - Assess and monitor skin integrity  - Assess and monitor nutrition and hydration status  - Monitor labs   - Assess for incontinence   - Turn and reposition patient  - Assist with mobility/ambulation  - Relieve pressure over bony prominences  - Avoid friction and shearing  - Provide appropriate hygiene as needed including keeping skin clean and dry  - Evaluate need for skin moisturizer/barrier cream  - Collaborate with interdisciplinary team   - Patient/family teaching  - Consider wound care consult   Outcome: Progressing     Problem: PAIN - ADULT  Goal: Verbalizes/displays adequate comfort level or baseline comfort level  Description  Interventions:  - Encourage patient to monitor pain and request assistance  - Assess pain using appropriate pain scale  - Administer analgesics based on type and severity of pain and evaluate response  - Implement non-pharmacological measures as appropriate and evaluate response  - Consider cultural and social influences on pain and pain management  - Notify physician/advanced practitioner if interventions unsuccessful or patient reports new pain  Outcome: Progressing     Problem: DISCHARGE PLANNING  Goal: Discharge to home or other facility with appropriate resources  Description  INTERVENTIONS:  - Identify barriers to discharge w/patient and caregiver  - Arrange for needed discharge resources and transportation as appropriate  - Identify discharge learning needs (meds, wound care, etc )  - Arrange for interpretive services to assist at discharge as needed  - Refer to Case Management Department for coordinating discharge planning if the patient needs post-hospital services based on physician/advanced practitioner order or complex needs related to functional status, cognitive ability, or social support system  Outcome: Progressing     Problem: Knowledge Deficit  Goal: Patient/family/caregiver demonstrates understanding of disease process, treatment plan, medications, and discharge instructions  Description  Complete learning assessment and assess knowledge base    Interventions:  - Provide teaching at level of understanding  - Provide teaching via preferred learning methods  Outcome: Progressing     Problem: GASTROINTESTINAL - ADULT  Goal: Maintains or returns to baseline bowel function  Description  INTERVENTIONS:  - Assess bowel function  - Encourage oral fluids to ensure adequate hydration  - Administer IV fluids if ordered to ensure adequate hydration  - Administer ordered medications as needed  - Encourage mobilization and activity  - Consider nutritional services referral to assist patient with adequate nutrition and appropriate food choices  Outcome: Progressing  Goal: Maintains adequate nutritional intake  Description  INTERVENTIONS:  - Monitor percentage of each meal consumed  - Identify factors contributing to decreased intake, treat as appropriate  - Assist with meals as needed  - Monitor I&O, weight, and lab values if indicated  - Obtain nutrition services referral as needed  Outcome: Progressing     Problem: GENITOURINARY - ADULT  Goal: Maintains or returns to baseline urinary function  Description  INTERVENTIONS:  - Assess urinary function  - Encourage oral fluids to ensure adequate hydration if ordered  - Administer IV fluids as ordered to ensure adequate hydration  - Administer ordered medications as needed  - Offer frequent toileting  - Follow urinary retention protocol if ordered  Outcome: Progressing  Goal: Absence of urinary retention  Description  INTERVENTIONS:  - Assess patient’s ability to void and empty bladder  - Monitor I/O  - Bladder scan as needed  - Discuss with physician/AP medications to alleviate retention as needed  - Discuss catheterization for long term situations as appropriate  Outcome: Progressing  Goal: Urinary catheter remains patent  Description  INTERVENTIONS:  - Assess patency of urinary catheter  - If patient has a chronic rivera, consider changing catheter if non-functioning  - Follow guidelines for intermittent irrigation of non-functioning urinary catheter  Outcome: Progressing     Problem: METABOLIC, FLUID AND ELECTROLYTES - ADULT  Goal: Fluid balance maintained  Description  INTERVENTIONS:  - Monitor labs   - Monitor I/O and WT  - Instruct patient on fluid and nutrition as appropriate  - Assess for signs & symptoms of volume excess or deficit  Outcome: Progressing     Problem: SKIN/TISSUE INTEGRITY - ADULT  Goal: Skin integrity remains intact  Description  INTERVENTIONS  - Identify patients at risk for skin breakdown  - Assess and monitor skin integrity  - Assess and monitor nutrition and hydration status  - Monitor labs (i e  albumin)  - Assess for incontinence   - Turn and reposition patient  - Assist with mobility/ambulation  - Relieve pressure over bony prominences  - Avoid friction and shearing  - Provide appropriate hygiene as needed including keeping skin clean and dry  - Evaluate need for skin moisturizer/barrier cream  - Collaborate with interdisciplinary team (i e  Nutrition, Rehabilitation, etc )   - Patient/family teaching  Outcome: Progressing

## 2020-05-30 NOTE — ASSESSMENT & PLAN NOTE
Acute kidney injury with a creatinine of 1 8  Baseline creatinine is 0 7  Will consult nephrology  Likely related to pre or post-renal etiology  Patient has received 3 L of normal saline and will continue with normal saline at 125 cc an hour  Still no urine output  Will provide with [place] catheter for urine output assessment  Avoid nephrotoxic agents

## 2020-05-30 NOTE — ASSESSMENT & PLAN NOTE
Noted elevated ammonia level of 66  Patient does not have impaired mentation at this time  Will continue to monitor

## 2020-05-30 NOTE — SEPSIS NOTE
Sepsis Note   Ananya Chauhan 59 y o  male MRN: [id number]  Unit/Bed#: ED 12 Encounter: [id number]      qSOFA     Row Name 05/30/20 1154                Altered mental status GCS < 15  —        Respiratory Rate > / =99  0        Systolic BP < / =319  0        Q Sofa Score  0            Initial Sepsis Screening     Row Name 05/30/20 1354                Is the patient's history suggestive of a new or worsening infection? (!) Yes (Proceed)  -PF        Suspected source of infection  urinary tract infection  -PF        Are two or more of the following signs & symptoms of infection both present and new to the patient? No  -PF        Indicate SIRS criteria  —        If the answer is yes to both questions, suspicion of sepsis is present  —        If severe sepsis is present AND tissue hypoperfusion perists in the hour after fluid resuscitation or lactate > 4, the patient meets criteria for SEPTIC SHOCK  —        Are any of the following organ dysfunction criteria present within 6 hours of suspected infection and SIRS criteria that are NOT considered to be chronic conditions?  —        Organ dysfunction  —        Date of presentation of severe sepsis  —        Time of presentation of severe sepsis  —        Tissue hypoperfusion persists in the hour after crystalloid fluid administration, evidenced, by either:  —        Was hypotension present within one hour of the conclusion of crystalloid fluid administration?  —        Date of presentation of septic shock  —        Time of presentation of septic shock  —          User Key  (r) = Recorded By, (t) = Taken By, (c) = Cosigned By    [address] Name Provider Type    PF Radha Coon MD Physician        Presents with weakness, diarrhea, found on the floor today because too weak to stand  CT consistent urine tract infection treated as UTI and sepsis  Patient does not have 2 signs of end-organ failure but does have a markedly positive lactate  Will require repeat lactate    Will aggressively hydrate at 30 cc/kilos  We will cover with broad-spectrum antibiotics patient will require admission

## 2020-05-30 NOTE — ED NOTES
1  CC- UTI, cirrhosis, ANGELES, sepsis    2  Orientation status- A&Ox4    3  Abnormal labs/ focused assessment/ vitals- ammonia 6 6, creatinine 1 82, AST 77, Albumin 2 6, Trop 0 16, lactic 5 8    4  Medications/ drips- NS, Rocephin    5  Narcotic time/ pain medication-n N/A    6  IV lines/ drains/ etc - 20LAC, 18 RAC    7  Isolation status- contact    8  Skin- WNL    9  Ambulation status- did not assess but pt states he walks independently at home    10   ED phone number- [phone] Garber, RN  05/30/20 1173

## 2020-05-31 PROBLEM — I21.A1 TYPE 2 MI (MYOCARDIAL INFARCTION) (HCC): Status: ACTIVE | Noted: 2020-05-31

## 2020-05-31 LAB
ALBUMIN SERPL BCP-MCNC: 2 G/DL (ref 3.5–5)
ALP SERPL-CCNC: 62 U/L (ref 46–116)
ALT SERPL W P-5'-P-CCNC: 51 U/L (ref 12–78)
ANION GAP SERPL CALCULATED.3IONS-SCNC: 9 MMOL/L (ref 4–13)
AST SERPL W P-5'-P-CCNC: 136 U/L (ref 5–45)
BASOPHILS # BLD AUTO: 0.03 THOUSANDS/ÂΜL (ref 0–0.1)
BASOPHILS NFR BLD AUTO: 1 % (ref 0–1)
BILIRUB SERPL-MCNC: 2.5 MG/DL (ref 0.2–1)
BUN SERPL-MCNC: 20 MG/DL (ref 5–25)
CALCIUM SERPL-MCNC: 7.6 MG/DL (ref 8.3–10.1)
CHLORIDE SERPL-SCNC: 106 MMOL/L (ref 100–108)
CK MB SERPL-MCNC: 4.1 NG/ML (ref 0–5)
CK MB SERPL-MCNC: <1 % (ref 0–2.5)
CK SERPL-CCNC: 1993 U/L (ref 39–308)
CO2 SERPL-SCNC: 22 MMOL/L (ref 21–32)
CREAT SERPL-MCNC: 0.88 MG/DL (ref 0.6–1.3)
EOSINOPHIL # BLD AUTO: 0.09 THOUSAND/ÂΜL (ref 0–0.61)
EOSINOPHIL NFR BLD AUTO: 2 % (ref 0–6)
ERYTHROCYTE [DISTWIDTH] IN BLOOD BY AUTOMATED COUNT: 15.1 % (ref 11.6–15.1)
GFR SERPL CREATININE-BSD FRML MDRD: 91 ML/MIN/1.73SQ M
GLUCOSE SERPL-MCNC: 118 MG/DL (ref 65–140)
HCT VFR BLD AUTO: 32.1 % (ref 36.5–49.3)
HGB BLD-MCNC: 11.3 G/DL (ref 12–17)
IMM GRANULOCYTES # BLD AUTO: 0.07 THOUSAND/UL (ref 0–0.2)
IMM GRANULOCYTES NFR BLD AUTO: 2 % (ref 0–2)
L PNEUMO1 AG UR QL IA.RAPID: NEGATIVE
LYMPHOCYTES # BLD AUTO: 1.01 THOUSANDS/ÂΜL (ref 0.6–4.47)
LYMPHOCYTES NFR BLD AUTO: 22 % (ref 14–44)
MCH RBC QN AUTO: 38.2 PG (ref 26.8–34.3)
MCHC RBC AUTO-ENTMCNC: 35.2 G/DL (ref 31.4–37.4)
MCV RBC AUTO: 108 FL (ref 82–98)
MONOCYTES # BLD AUTO: 0.65 THOUSAND/ÂΜL (ref 0.17–1.22)
MONOCYTES NFR BLD AUTO: 14 % (ref 4–12)
NEUTROPHILS # BLD AUTO: 2.76 THOUSANDS/ÂΜL (ref 1.85–7.62)
NEUTS SEG NFR BLD AUTO: 59 % (ref 43–75)
NRBC BLD AUTO-RTO: 0 /100 WBCS
PLATELET # BLD AUTO: 49 THOUSANDS/UL (ref 149–390)
PMV BLD AUTO: 10.5 FL (ref 8.9–12.7)
POTASSIUM SERPL-SCNC: 3.6 MMOL/L (ref 3.5–5.3)
PROCALCITONIN SERPL-MCNC: 10.52 NG/ML
PROT SERPL-MCNC: 5.4 G/DL (ref 6.4–8.2)
RBC # BLD AUTO: 2.96 MILLION/UL (ref 3.88–5.62)
S PNEUM AG UR QL: NEGATIVE
SODIUM SERPL-SCNC: 137 MMOL/L (ref 136–145)
TROPONIN I SERPL-MCNC: 0.13 NG/ML
WBC # BLD AUTO: 4.61 THOUSAND/UL (ref 4.31–10.16)

## 2020-05-31 PROCEDURE — 84484 ASSAY OF TROPONIN QUANT: CPT | Performed by: PHYSICIAN ASSISTANT

## 2020-05-31 PROCEDURE — 85025 COMPLETE CBC W/AUTO DIFF WBC: CPT | Performed by: FAMILY MEDICINE

## 2020-05-31 PROCEDURE — 80053 COMPREHEN METABOLIC PANEL: CPT | Performed by: FAMILY MEDICINE

## 2020-05-31 PROCEDURE — 82550 ASSAY OF CK (CPK): CPT | Performed by: FAMILY MEDICINE

## 2020-05-31 PROCEDURE — 99233 SBSQ HOSP IP/OBS HIGH 50: CPT | Performed by: STUDENT IN AN ORGANIZED HEALTH CARE EDUCATION/TRAINING PROGRAM

## 2020-05-31 PROCEDURE — 82553 CREATINE MB FRACTION: CPT | Performed by: FAMILY MEDICINE

## 2020-05-31 RX ORDER — ACETAMINOPHEN 325 MG/1
650 TABLET ORAL EVERY 6 HOURS PRN
Status: DISCONTINUED | OUTPATIENT
Start: 2020-05-31 | End: 2020-06-03 | Stop reason: HOSPADM

## 2020-05-31 RX ADMIN — AZITHROMYCIN 500 MG: 500 TABLET, FILM COATED ORAL at 20:03

## 2020-05-31 RX ADMIN — ACETAMINOPHEN 650 MG: 325 TABLET, FILM COATED ORAL at 11:08

## 2020-05-31 RX ADMIN — SODIUM CHLORIDE 125 ML/HR: 0.9 INJECTION, SOLUTION INTRAVENOUS at 18:24

## 2020-05-31 RX ADMIN — SODIUM CHLORIDE 125 ML/HR: 0.9 INJECTION, SOLUTION INTRAVENOUS at 07:38

## 2020-05-31 RX ADMIN — CEFTRIAXONE SODIUM 1000 MG: 10 INJECTION, POWDER, FOR SOLUTION INTRAVENOUS at 14:58

## 2020-05-31 NOTE — ASSESSMENT & PLAN NOTE
· Follows with GI as an outpatient, last seen approximately 1 month ago  · No evidence of encephalopathy on exam, no asterixis noted  · LFT's stable, GI consulted

## 2020-05-31 NOTE — ASSESSMENT & PLAN NOTE
· Likely secondary to volume loss from profuse diarrhea  · Creatinine now much improved, ANGELES resolved status post IV hydration  · Monitor daily

## 2020-05-31 NOTE — UTILIZATION REVIEW
Initial Clinical Review    Admission: Date/Time/Statement: Admission Orders (From admission, onward)     Ordered        05/30/20 1351  Inpatient Admission  Once                   Orders Placed This Encounter   Procedures   • Inpatient Admission     Standing Status:   Standing     Number of Occurrences:   1     Order Specific Question:   Admitting Physician     Answer:   Henrik Leonard     Order Specific Question:   Level of Care     Answer:   Med Surg [16]     Order Specific Question:   Estimated length of stay     Answer:   More than 2 Midnights     Order Specific Question:   Certification     Answer:   I certify that inpatient services are medically necessary for this patient for a duration of greater than two midnights  See H&P and MD Progress Notes for additional information about the patient's course of treatment  ED Arrival Information     Expected Arrival Acuity Means of Arrival Escorted By Service Admission Type    - 5/30/2020 11:50 Urgent Ambulance [address] Urgent    Arrival Complaint    DIARRHEA        Chief Complaint   Patient presents with   • Diarrhea     EMS reports pt was found covered in feces on the bathroom  Pt room mate said pt slept all day yesterday and he found him in the bathroom unable to move  Assessment/Plan:    59  Y O male  Presents to ED via  EMS from home with generalized weakness and dysuria that started th am of admission  Roommate found patient  Covered in feces the am of admission,  States  Patient  Slept all day  The day prior to this  States he woke the am of admission and was too weak to get  Out of  Bed  States he has  Been urinating nonstop,  Nothing  Really  Comes out  Had 1   Loose stool  States he is  Always  Cold/chilled  Labs  Reveal elevated ammonia, elevated bilirubin,  Elevated  Lactic acid and  Creatinine  PMH  Is  Hypertension and alcoholic cirrhosis     Admit  Ip  With   Dysuria,   UTI,   acute kidney injury, hyperammonemia, hyperbilirubinemia and sepsis and plan is  Monitor labs/cultures,  IVF, nephrology consult, rivera,   And  ZOYA  ED Triage Vitals   Temperature Pulse Respirations Blood Pressure SpO2   05/30/20 1156 05/30/20 1154 05/30/20 1154 05/30/20 1154 05/30/20 1154   97 7 °F (36 5 °C) 90 18 106/53 93 %      Temp Source Heart Rate Source Patient Position - Orthostatic VS BP Location FiO2 (%)   05/30/20 1156 05/30/20 1154 -- 05/30/20 1154 --   Temporal Monitor  Right arm       Pain Score       05/30/20 1450       2        Wt Readings from Last 1 Encounters:   05/30/20 106 kg (233 lb 0 4 oz)     Additional Vital Signs:   05/31/20 07:53:41  99 1 °F (37 3 °C)  81  —  118/65  83  96 %  —   05/30/20 22:20:18  99 °F (37 2 °C)  82  19  118/65  83  93 %  —   05/30/20 2100  98 3 °F (36 8 °C)  —  —  —  —  95 %  None (Room air)   05/30/20 20:30:33  98 3 °F (36 8 °C)  80  20  118/65  83  95 %  —   05/30/20 1700  —  90  —  —  —  92 %  —   05/30/20 1500  97 7 °F (36 5 °C)  —  —  —  —  —  —   05/30/20 14:51:11  —  92  —  160/88  112  96 %  —   05/30/20 1450  —  94  22  160/88  112  95 %  None (Room air)   05/30/20 1400  —  90  20  145/73  —  96 %  —   05/30/20 1156  97 7 °F (36 5 °C)  —  —  —  —  —  —   05/30/20 1154  —  90  18  106/53  —  93 %  None (Room air)         Pertinent Labs/Diagnostic Test Results:     CXR  ( 5/30)     Ill-defined right middle lobe infiltrate which could be atelectasis or pneumonia  1 9 cm left upper lobe nodule is suggested as well   A CT of the chest with contrast is recommended for further workup  Ct  Abd/pelvis  ( 5/30)    Evidence of cystitis with marked bladder wall thickening with left ureteral wall enhancement suggesting ascending urinary tract infection  2   Moderate mesenteric stranding, worsened compared to the previous CT though of uncertain etiology   This may be related to an acute inflammatory process such as mesenteric right as versus venous congestion from portal hypertension  No vascular   thrombosis is visualized, however  3   Cirrhosis with portal hypertension  4   Gallbladder wall thickening likely on the basis of hepatocellular disease    Results from last 7 days   Lab Units 05/30/20  1247   SARS-COV-2  Negative     Results from last 7 days   Lab Units 05/31/20  0614 05/30/20  1517 05/30/20  1202   WBC Thousand/uL 4 61  --  5 82   HEMOGLOBIN g/dL 11 3*  --  12 5   HEMATOCRIT % 32 1*  --  35 7*   PLATELETS Thousands/uL 49* 42* 58*   NEUTROS ABS Thousands/µL 2 76  --  4 81         Results from last 7 days   Lab Units 05/31/20  0614 05/30/20  1202   SODIUM mmol/L 137 135*   POTASSIUM mmol/L 3 6 3 6   CHLORIDE mmol/L 106 100   CO2 mmol/L 22 23   ANION GAP mmol/L 9 12   BUN mg/dL 20 19   CREATININE mg/dL 0 88 1 82*   EGFR ml/min/1 73sq m 91 38   CALCIUM mg/dL 7 6* 8 1*     Results from last 7 days   Lab Units 05/31/20  0614 05/30/20  1202   AST U/L 136* 77*   ALT U/L 51 43   ALK PHOS U/L 62 87   TOTAL PROTEIN g/dL 5 4* 6 5   ALBUMIN g/dL 2 0* 2 6*   TOTAL BILIRUBIN mg/dL 2 50* 4 00*   BILIRUBIN DIRECT mg/dL  --  1 73*   AMMONIA umol/L  --  66*         Results from last 7 days   Lab Units 05/31/20  0614 05/30/20  1202   GLUCOSE RANDOM mg/dL 118 109               Results from last 7 days   Lab Units 05/31/20  0614 05/30/20  1202   CK TOTAL U/L 1,993* 349*   CK MB INDEX % <1 0 <1 0   CK MB ng/mL 4 1 1 3     Results from last 7 days   Lab Units 05/31/20  0614 05/30/20  2216 05/30/20  1720 05/30/20  1517 05/30/20  1202   TROPONIN I ng/mL 0 13* 0 17* 0 19* 0 20* 0 16*         Results from last 7 days   Lab Units 05/30/20  1202   PROTIME seconds 18 8*   INR  1 57*   PTT seconds 36         Results from last 7 days   Lab Units 05/30/20  1202   PROCALCITONIN ng/ml 6 59*     Results from last 7 days   Lab Units 05/30/20  1719 05/30/20  1400 05/30/20  1202   LACTIC ACID mmol/L 2 0 3 5* 5 8*           Results from last 7 days   Lab Units 05/30/20  1518   CLARITY UA  Slightly Cloudy COLOR UA  [place]   SPEC GRAV UA  <=1 005   PH UA  6 0   GLUCOSE UA mg/dl Negative   KETONES UA mg/dl Negative   BLOOD UA  Large*   PROTEIN UA mg/dl Trace*   NITRITE UA  Positive*   BILIRUBIN UA  Negative   UROBILINOGEN UA E U /dl 1 0   LEUKOCYTES UA  Small*   WBC UA /hpf 4-10*   RBC UA /hpf 10-20*   BACTERIA UA /hpf Occasional   EPITHELIAL CELLS WET PREP /hpf Occasional               Results from last 7 days   Lab Units 05/30/20  1216 05/30/20  1202   BLOOD CULTURE  Received in Microbiology Lab  Culture in Progress    --    GRAM STAIN RESULT   --  Gram negative rods*               ED Treatment:   Medication Administration from 05/30/2020 1149 to 05/30/2020 1420       Date/Time Order Dose Route Action Comments     05/30/2020 1312 sodium chloride 0 9 % bolus 1,000 mL 0 mL Intravenous Stopped      05/30/2020 1204 sodium chloride 0 9 % bolus 1,000 mL 1,000 mL Intravenous New Bag      05/30/2020 1302 iohexol (OMNIPAQUE) 350 MG/ML injection (MULTI-DOSE) 100 mL 100 mL Intravenous Given      05/30/2020 1322 sodium chloride 0 9 % bolus 1,000 mL 1,000 mL Intravenous New Bag      05/30/2020 1414 ceftriaxone (ROCEPHIN) 1 g/50 mL in dextrose IVPB 0 mg Intravenous Stopped      05/30/2020 1354 ceftriaxone (ROCEPHIN) 1 g/50 mL in dextrose IVPB 1,000 mg Intravenous New Bag      05/30/2020 1354 sodium chloride 0 9 % bolus 1,250 mL 1,250 mL Intravenous New Bag      Present on Admission:  • Alcoholic cirrhosis ([place] [address] )  • Hypertension      Admitting Diagnosis: Diarrhea [R19 7]  Cirrhosis ([place] [address] ) [K74 60]  Urinary tract infection [N39 0]  Acute kidney injury ([place] [address] ) [N17 9]  Sepsis ([place] [address] ) [A41 9]  Age/Sex: 59 y o  male  Admission Orders:  Scheduled Medications:    Medications:  azithromycin 500 mg Oral Q24H   cefTRIAXone 1,000 mg Intravenous Q24H   nicotine 1 patch Transdermal Daily     Continuous IV Infusions:    sodium chloride 125 mL/hr Intravenous Continuous     PRN Meds:    acetaminophen 650 mg Oral Q6H PRN       IP CONSULT TO UROLOGY    Network Utilization Review Department  [email or web address]  ATTENTION: Please call with any questions or concerns to [phone] and carefully listen to the prompts so that you are directed to the right person  All voicemails are confidential   Enrique Elizondo all requests for admission clinical reviews, approved or denied determinations and any other requests to dedicated fax number below belonging to the campus where the patient is receiving treatment   List of dedicated fax numbers for the Facilities:  FACILITY NAME UR FAX NUMBER   ADMISSION DENIALS (Administrative/Medical Necessity) [phone]   [address] [place] [address] (Maternity/NICU/Pediatrics) [phone]   400 [facility] [phone]   [place] [address] [phone]   [address] [phone]   201 [facility] [phone]   1463 [facility] 119 [phone]   [facility]  [phone]   405 [facility] [phone] 412 [facility] 850 [facility] [phone]

## 2020-05-31 NOTE — ASSESSMENT & PLAN NOTE
· Urine culture positive for gram-negative rods  · Continue with ceftriaxone for now, follow-up urine culture sensitivity

## 2020-05-31 NOTE — PROGRESS NOTES
Progress Note - Justine Brady 1955, 59 y o  male MRN: [id number]    Unit/Bed#: -01 Encounter: [id number]    Primary Care Provider: No primary care provider on file  Date and time admitted to hospital: 5/30/2020 11:51 AM        Type 2 MI (myocardial infarction) ([facility] )  Assessment & Plan  · Non chest pain associated elevation in troponin  · Likely secondary to underlying urinary tract infection and acute kidney injury in setting of cirrhosis  · No further inpatient intervention indicated at this time    Dysuria  Assessment & Plan  · Urine culture positive for gram-negative rods  · Continue with ceftriaxone for now, follow-up urine culture sensitivity  · Likely secondary to BPH, consult urology     Acute kidney injury [facility])  Assessment & Plan  · Likely secondary to volume loss from profuse diarrhea  · Creatinine now much improved, ANGELES resolved status post IV hydration  · Monitor daily    Hyperammonemia (HCC)  Assessment & Plan  · Noted elevated ammonia level of 66  · Patient does not have impaired mentation at this time  · Will continue to monitor  Hyperbilirubinemia  Assessment & Plan  · Secondary to underlying cirrhosis  · Monitor daily    [facility]is ([facility] )  Assessment & Plan  · Likely secondary to underlying urinary tract infection  · See plan for dysuria    Alcoholic cirrhosis ([facility] )  Assessment & Plan  · Follows with GI as an outpatient, last seen approximately 1 month ago  · No evidence of encephalopathy on exam, no asterixis noted  · LFT's stable, GI consulted    Hypertension  Assessment & Plan  · Blood Pressure stable, monitor daily    * Urinary tract infection  Assessment & Plan  · See plan for dysuria        VTE Pharmacologic Prophylaxis:   Pharmacologic: Pharmacologic VTE Prophylaxis contraindicated due to Thrombocytopenia  Mechanical VTE Prophylaxis in Place: Yes    Patient Centered Rounds: I have performed bedside rounds with nursing staff today  Time Spent for Care: 45 minutes    More than 50% of total time spent on counseling and coordination of care as described above  Current Length of Stay: 1 day(s)    Current Patient Status: Inpatient   Certification Statement: The patient will continue to require additional inpatient hospital stay due to UTI    Discharge Plan:  48 hours    Code Status: Level 1 - Full Code      Subjective:   Patient feels better today    Objective:     Vitals:   Temp (24hrs), Av 4 °F (36 9 °C), Min:97 7 °F (36 5 °C), Max:99 1 °F (37 3 °C)    Temp:  [97 7 °F (36 5 °C)-99 1 °F (37 3 °C)] 99 1 °F (37 3 °C)  HR:  [80-94] 81  Resp:  [18-22] 19  BP: (106-160)/(53-88) 118/65  SpO2:  [92 %-96 %] 96 %  Body mass index is 29 13 kg/m²  Input and Output Summary (last 24 hours): Intake/Output Summary (Last 24 hours) at 2020 0949  Last data filed at 2020 4383  Gross per 24 hour   Intake 5148 34 ml   Output 730 ml   Net 4418 34 ml       Physical Exam:     Physical Exam   Constitutional: He is oriented to person, place, and time  He appears well-developed and well-nourished  Cardiovascular: Normal rate and regular rhythm  Pulmonary/Chest: Effort normal and breath sounds normal    Abdominal: Soft  Bowel sounds are normal    Neurological: He is alert and oriented to person, place, and time  Skin: Skin is warm and dry  Psychiatric: He has a normal mood and affect   His behavior is normal      Additional Data:     Labs:    Results from last 7 days   Lab Units 20  0614   WBC Thousand/uL 4 61   HEMOGLOBIN g/dL 11 3*   HEMATOCRIT % 32 1*   PLATELETS Thousands/uL 49*   NEUTROS PCT % 59   LYMPHS PCT % 22   MONOS PCT % 14*   EOS PCT % 2     Results from last 7 days   Lab Units 20  0614   SODIUM mmol/L 137   POTASSIUM mmol/L 3 6   CHLORIDE mmol/L 106   CO2 mmol/L 22   BUN mg/dL 20   CREATININE mg/dL 0 88   ANION GAP mmol/L 9   CALCIUM mg/dL 7 6*   ALBUMIN g/dL 2 0*   TOTAL BILIRUBIN mg/dL 2 50*   ALK PHOS U/L 62   ALT U/L 51   AST U/L 136*   GLUCOSE RANDOM mg/dL 118     Results from last 7 days   Lab Units 05/30/20  1202   INR  1 57*             Results from last 7 days   Lab Units 05/30/20  1719 05/30/20  1400 05/30/20  1202   LACTIC ACID mmol/L 2 0 3 5* 5 8*   PROCALCITONIN ng/ml  --   --  6 59*           * I Have Reviewed All Lab Data Listed Above  * Additional Pertinent Lab Tests Reviewed: All [place] Admission Reviewed    Imaging:    Imaging Reports Reviewed Today Include:  X-ray, CT abdomen pelvis  Imaging Personally Reviewed by Myself Includes:  See above    Recent Cultures (last 7 days):     Results from last 7 days   Lab Units 05/30/20  1216 05/30/20  1202   BLOOD CULTURE  Received in Microbiology Lab  Culture in Progress  --    GRAM STAIN RESULT   --  Gram negative rods*       Last 24 Hours Medication List:     Current Facility-Administered Medications:  azithromycin 500 mg Oral Q24H BETTINA Kothari    cefTRIAXone 1,000 mg Intravenous Q24H Gonzales Mccall MD    nicotine 1 patch Transdermal Daily Gonzales Mccall MD    sodium chloride 125 mL/hr Intravenous Continuous Gonzales Mccall MD Last Rate: 125 mL/hr (05/31/20 2233)        Today, Patient Was Seen By: LISE Hook      ** Please Note: Dictation voice to text software may have been used in the creation of this document   **

## 2020-05-31 NOTE — NURSING NOTE
Lab called for critical lab value (platlets of 49) but no one is on call  Message given to [facility], to notify SLIM

## 2020-05-31 NOTE — ASSESSMENT & PLAN NOTE
· Noted elevated ammonia level of 66  · Patient does not have impaired mentation at this time  · Will continue to monitor

## 2020-05-31 NOTE — QUICK NOTE
Notified of immediate findings on chest x-ray that consisted of  Ill-defined right middle lobe infiltrate which could be atelectasis or pneumonia  Procalcitonin elevated at 6 59, repeat in a m    Incentive spirometry  Blood cultures are pending  COVID is negative  Patient also has a UTI with urine cultures pending  Urine antigens pending  Azithromycin added

## 2020-05-31 NOTE — ASSESSMENT & PLAN NOTE
· Non chest pain associated elevation in troponin  · Likely secondary to underlying urinary tract infection and acute kidney injury in setting of cirrhosis  · No further inpatient intervention indicated at this time

## 2020-06-01 ENCOUNTER — APPOINTMENT (INPATIENT)
Dept: RADIOLOGY | Facility: HOSPITAL | Age: 65
DRG: 871 | End: 2020-06-01
Payer: COMMERCIAL

## 2020-06-01 LAB
ALBUMIN SERPL BCP-MCNC: 2.1 G/DL (ref 3.5–5)
ALP SERPL-CCNC: 62 U/L (ref 46–116)
ALT SERPL W P-5'-P-CCNC: 58 U/L (ref 12–78)
ANION GAP SERPL CALCULATED.3IONS-SCNC: 9 MMOL/L (ref 4–13)
AST SERPL W P-5'-P-CCNC: 152 U/L (ref 5–45)
BASOPHILS # BLD AUTO: 0.05 THOUSANDS/ÂΜL (ref 0–0.1)
BASOPHILS NFR BLD AUTO: 2 % (ref 0–1)
BILIRUB SERPL-MCNC: 2 MG/DL (ref 0.2–1)
BUN SERPL-MCNC: 15 MG/DL (ref 5–25)
CALCIUM SERPL-MCNC: 7.6 MG/DL (ref 8.3–10.1)
CHLORIDE SERPL-SCNC: 105 MMOL/L (ref 100–108)
CO2 SERPL-SCNC: 21 MMOL/L (ref 21–32)
CREAT SERPL-MCNC: 0.71 MG/DL (ref 0.6–1.3)
EOSINOPHIL # BLD AUTO: 0.14 THOUSAND/ÂΜL (ref 0–0.61)
EOSINOPHIL NFR BLD AUTO: 4 % (ref 0–6)
ERYTHROCYTE [DISTWIDTH] IN BLOOD BY AUTOMATED COUNT: 14.7 % (ref 11.6–15.1)
GFR SERPL CREATININE-BSD FRML MDRD: 99 ML/MIN/1.73SQ M
GLUCOSE SERPL-MCNC: 86 MG/DL (ref 65–140)
HCT VFR BLD AUTO: 33.6 % (ref 36.5–49.3)
HGB BLD-MCNC: 11.8 G/DL (ref 12–17)
IMM GRANULOCYTES # BLD AUTO: 0.02 THOUSAND/UL (ref 0–0.2)
IMM GRANULOCYTES NFR BLD AUTO: 1 % (ref 0–2)
LYMPHOCYTES # BLD AUTO: 1.27 THOUSANDS/ÂΜL (ref 0.6–4.47)
LYMPHOCYTES NFR BLD AUTO: 38 % (ref 14–44)
MAGNESIUM SERPL-MCNC: 1.5 MG/DL (ref 1.6–2.6)
MCH RBC QN AUTO: 37.3 PG (ref 26.8–34.3)
MCHC RBC AUTO-ENTMCNC: 35.1 G/DL (ref 31.4–37.4)
MCV RBC AUTO: 106 FL (ref 82–98)
MONOCYTES # BLD AUTO: 0.57 THOUSAND/ÂΜL (ref 0.17–1.22)
MONOCYTES NFR BLD AUTO: 17 % (ref 4–12)
NEUTROPHILS # BLD AUTO: 1.23 THOUSANDS/ÂΜL (ref 1.85–7.62)
NEUTS SEG NFR BLD AUTO: 38 % (ref 43–75)
NRBC BLD AUTO-RTO: 0 /100 WBCS
PLATELET # BLD AUTO: 61 THOUSANDS/UL (ref 149–390)
PMV BLD AUTO: 9.8 FL (ref 8.9–12.7)
POTASSIUM SERPL-SCNC: 3.7 MMOL/L (ref 3.5–5.3)
PROCALCITONIN SERPL-MCNC: 3.57 NG/ML
PROT SERPL-MCNC: 5.9 G/DL (ref 6.4–8.2)
RBC # BLD AUTO: 3.16 MILLION/UL (ref 3.88–5.62)
SODIUM SERPL-SCNC: 135 MMOL/L (ref 136–145)
WBC # BLD AUTO: 3.28 THOUSAND/UL (ref 4.31–10.16)

## 2020-06-01 PROCEDURE — 83735 ASSAY OF MAGNESIUM: CPT | Performed by: STUDENT IN AN ORGANIZED HEALTH CARE EDUCATION/TRAINING PROGRAM

## 2020-06-01 PROCEDURE — 80053 COMPREHEN METABOLIC PANEL: CPT | Performed by: STUDENT IN AN ORGANIZED HEALTH CARE EDUCATION/TRAINING PROGRAM

## 2020-06-01 PROCEDURE — 71045 X-RAY EXAM CHEST 1 VIEW: CPT

## 2020-06-01 PROCEDURE — 84145 PROCALCITONIN (PCT): CPT | Performed by: NURSE PRACTITIONER

## 2020-06-01 PROCEDURE — 99233 SBSQ HOSP IP/OBS HIGH 50: CPT | Performed by: NURSE PRACTITIONER

## 2020-06-01 PROCEDURE — 99223 1ST HOSP IP/OBS HIGH 75: CPT | Performed by: NURSE PRACTITIONER

## 2020-06-01 PROCEDURE — 85025 COMPLETE CBC W/AUTO DIFF WBC: CPT | Performed by: STUDENT IN AN ORGANIZED HEALTH CARE EDUCATION/TRAINING PROGRAM

## 2020-06-01 RX ORDER — FUROSEMIDE 10 MG/ML
20 INJECTION INTRAMUSCULAR; INTRAVENOUS ONCE
Status: COMPLETED | OUTPATIENT
Start: 2020-06-01 | End: 2020-06-01

## 2020-06-01 RX ORDER — TAMSULOSIN HYDROCHLORIDE 0.4 MG/1
0.4 CAPSULE ORAL
Status: DISCONTINUED | OUTPATIENT
Start: 2020-06-01 | End: 2020-06-03 | Stop reason: HOSPADM

## 2020-06-01 RX ORDER — LEVALBUTEROL 1.25 MG/.5ML
1.25 SOLUTION, CONCENTRATE RESPIRATORY (INHALATION) EVERY 8 HOURS PRN
Status: DISCONTINUED | OUTPATIENT
Start: 2020-06-01 | End: 2020-06-01

## 2020-06-01 RX ORDER — ALBUTEROL SULFATE 2.5 MG/3ML
2.5 SOLUTION RESPIRATORY (INHALATION) EVERY 6 HOURS PRN
Status: DISCONTINUED | OUTPATIENT
Start: 2020-06-01 | End: 2020-06-03 | Stop reason: HOSPADM

## 2020-06-01 RX ADMIN — CEFTRIAXONE SODIUM 1000 MG: 10 INJECTION, POWDER, FOR SOLUTION INTRAVENOUS at 16:03

## 2020-06-01 RX ADMIN — NICOTINE 1 PATCH: 7 PATCH TRANSDERMAL at 08:46

## 2020-06-01 RX ADMIN — AZITHROMYCIN 500 MG: 500 TABLET, FILM COATED ORAL at 19:39

## 2020-06-01 RX ADMIN — ACETAMINOPHEN 650 MG: 325 TABLET, FILM COATED ORAL at 16:12

## 2020-06-01 RX ADMIN — TAMSULOSIN HYDROCHLORIDE 0.4 MG: 0.4 CAPSULE ORAL at 19:36

## 2020-06-01 RX ADMIN — FUROSEMIDE 20 MG: 10 INJECTION, SOLUTION INTRAMUSCULAR; INTRAVENOUS at 04:51

## 2020-06-01 NOTE — ASSESSMENT & PLAN NOTE
· Urine culture positive for gram-negative rods  · Continue with ceftriaxone for now,   · follow-up urine culture final growth and sensitivity  · Of note patient is afebrile without leukocytosis  · Discussion with Urology this time will start Flomax this evening and voiding trial in a m   This well overall clear from Urology perspective discharge with outpatient follow-up

## 2020-06-01 NOTE — QUICK NOTE
Contacted by  RN with pt with c/o SOB; with h/o COPD, neb tx given  Portal xray grossly wnl  Per resp therapy, gross rales on ausculation - fluids stopped; 1 x low dose lasix given

## 2020-06-01 NOTE — ASSESSMENT & PLAN NOTE
· Likely secondary to volume loss from profuse diarrhea  · Creatinine now much improved, ANGELES resolved status post IV hydration  · Continue to monitor

## 2020-06-01 NOTE — PROGRESS NOTES
Pt AAOx4 in bed  Pt c/o generalized pain and weakness  All AM medications given  Pt ate 100% of his breakfast  Pt helped to bedside chair  Call bell in reach

## 2020-06-01 NOTE — ASSESSMENT & PLAN NOTE
· Noted elevated ammonia level of 66  · Patient does not have impaired mentation at this time  · continue to monitor

## 2020-06-01 NOTE — RESPIRATORY THERAPY NOTE
Called by RN to evaluate pt for SOB  Nebulizer tx ordered by on call provider  BS bilateral rales upon ascultation  Neb tx discontinued due to no indication  Discussed need for possible fluid restriction w/ RN

## 2020-06-01 NOTE — UTILIZATION REVIEW
Notification of Inpatient Admission/Inpatient Authorization Request   This is a Notification of Inpatient Admission for [address]  Be advised that this patient was admitted to our facility under Inpatient Status  Contact Suri Cruz at [phone] for additional admission information  [facility] DEPT DEDICATED Angelevelyn Teofilo [phone]  Patient Name:   Nino Vásquez   YOB: 1955       [address]   [address]:   [place]  Tax ID: [id number]  NPI: [id number] Attending Provider/NPI:  Phone:  Address: [address] [[id number]]  [phone]  Same as Facility   Place of Service Code: 24     [facility]   Start Date: 5/30/20 1351 Discharge Date & Time: No discharge date for patient encounter  Type of Admission: Inpatient Status Discharge Disposition   (if discharged): Home/Self Care   Patient Diagnoses: Diarrhea [R19 7]  Cirrhosis ([facility] ) [K74 60]  Urinary tract infection [N39 0]  Acute kidney injury ([facility] ) [N17 9]  Sepsis ([facility] [address] ) [A41 9]     Orders: Admission Orders (From admission, onward)     Ordered        05/30/20 1351  Inpatient Admission  Once                    Assigned Utilization Review Contact: Suri Cruz  Utilization   Network Utilization Review Department  Phone: [phone]; Fax [phone]  Email: [email or web address]   ATTENTION PAYERS: Please call the assigned Utilization  directly with any questions or concerns ALL voicemails in the department are confidential  Send all requests for admission clinical reviews, approved or denied determinations and any other requests to dedicated fax number belonging to the campus where the patient is receiving treatment    Initial Clinical Review    Admission: Date/Time/Statement:   Admission Orders (From admission, onward)     Ordered        05/30/20 1351  Inpatient Admission Once                   Orders Placed This Encounter   Procedures   • Inpatient Admission     Standing Status:   Standing     Number of Occurrences:   1     Order Specific Question:   Admitting Physician     Answer:   Kelly Howard     Order Specific Question:   Level of Care     Answer:   Med Surg [16]     Order Specific Question:   Estimated length of stay     Answer:   More than 2 Midnights     Order Specific Question:   Certification     Answer:   I certify that inpatient services are medically necessary for this patient for a duration of greater than two midnights  See H&P and MD Progress Notes for additional information about the patient's course of treatment  ED Arrival Information     Expected Arrival Acuity Means of Arrival Escorted By Service Admission Type    - 5/30/2020 11:50 Urgent Ambulance [address] Urgent    Arrival Complaint    DIARRHEA        Chief Complaint   Patient presents with   • Diarrhea     EMS reports pt was found covered in feces on the bathroom  Pt room mate said pt slept all day yesterday and he found him in the bathroom unable to move  Assessment/Plan:    59  Y O male  Presents to ED via  EMS from home with generalized weakness and dysuria that started th am of admission  Roommate found patient  Covered in feces the am of admission,  States  Patient  Slept all day  The day prior to this  States he woke the am of admission and was too weak to get  Out of  Bed  States he has  Been urinating nonstop,  Nothing  Really  Comes out  Had 1   Loose stool  States he is  Always  Cold/chilled  Labs  Reveal elevated ammonia, elevated bilirubin,  Elevated  Lactic acid and  Creatinine  PMH  Is  Hypertension and alcoholic cirrhosis  Admit  Ip  With   Dysuria,   UTI,   acute kidney injury, hyperammonemia, hyperbilirubinemia and sepsis and plan is  Monitor labs/cultures,  IVF, nephrology consult, rivera,   And  ZOYA          ED Triage Vitals   Temperature Pulse Respirations Blood Pressure SpO2   05/30/20 1156 05/30/20 1154 05/30/20 1154 05/30/20 1154 05/30/20 1154   97 7 °F (36 5 °C) 90 18 106/53 93 %      Temp Source Heart Rate Source Patient Position - Orthostatic VS BP Location FiO2 (%)   05/30/20 1156 05/30/20 1154 -- 05/30/20 1154 --   Temporal Monitor  Right arm       Pain Score       05/30/20 1450       2          Wt Readings from Last 1 Encounters:   05/30/20 106 kg (233 lb 0 4 oz)     Additional Vital Signs:   05/31/20 07:53:41  99 1 °F (37 3 °C)  81  —  118/65  83  96 %  —   05/30/20 22:20:18  99 °F (37 2 °C)  82  19  118/65  83  93 %  —   05/30/20 2100  98 3 °F (36 8 °C)  —  —  —  —  95 %  None (Room air)   05/30/20 20:30:33  98 3 °F (36 8 °C)  80  20  118/65  83  95 %  —   05/30/20 1700  —  90  —  —  —  92 %  —   05/30/20 1500  97 7 °F (36 5 °C)  —  —  —  —  —  —   05/30/20 14:51:11  —  92  —  160/88  112  96 %  —   05/30/20 1450  —  94  22  160/88  112  95 %  None (Room air)   05/30/20 1400  —  90  20  145/73  —  96 %  —   05/30/20 1156  97 7 °F (36 5 °C)  —  —  —  —  —  —   05/30/20 1154  —  90  18  106/53  —  93 %  None (Room air)         Pertinent Labs/Diagnostic Test Results:     CXR  ( 5/30)     Ill-defined right middle lobe infiltrate which could be atelectasis or pneumonia  1 9 cm left upper lobe nodule is suggested as well   A CT of the chest with contrast is recommended for further workup  Ct  Abd/pelvis  ( 5/30)    Evidence of cystitis with marked bladder wall thickening with left ureteral wall enhancement suggesting ascending urinary tract infection  2   Moderate mesenteric stranding, worsened compared to the previous CT though of uncertain etiology  This may be related to an acute inflammatory process such as mesenteric right as versus venous congestion from portal hypertension  No vascular   thrombosis is visualized, however  3   Cirrhosis with portal hypertension      4   Gallbladder wall thickening likely on the basis of hepatocellular disease    Results from last 7 days   Lab Units 05/30/20  1247   SARS-COV-2  Negative     Results from last 7 days   Lab Units 06/01/20  0611 05/31/20  0614 05/30/20  1517 05/30/20  1202   WBC Thousand/uL 3 28* 4 61  --  5 82   HEMOGLOBIN g/dL 11 8* 11 3*  --  12 5   HEMATOCRIT % 33 6* 32 1*  --  35 7*   PLATELETS Thousands/uL 61* 49* 42* 58*   NEUTROS ABS Thousands/µL 1 23* 2 76  --  4 81         Results from last 7 days   Lab Units 06/01/20  0611 05/31/20  0614 05/30/20  1202   SODIUM mmol/L 135* 137 135*   POTASSIUM mmol/L 3 7 3 6 3 6   CHLORIDE mmol/L 105 106 100   CO2 mmol/L 21 22 23   ANION GAP mmol/L 9 9 12   BUN mg/dL 15 20 19   CREATININE mg/dL 0 71 0 88 1 82*   EGFR ml/min/1 73sq m 99 91 38   CALCIUM mg/dL 7 6* 7 6* 8 1*   MAGNESIUM mg/dL 1 5*  --   --      Results from last 7 days   Lab Units 06/01/20  0611 05/31/20  0614 05/30/20  1202   AST U/L 152* 136* 77*   ALT U/L 58 51 43   ALK PHOS U/L 62 62 87   TOTAL PROTEIN g/dL 5 9* 5 4* 6 5   ALBUMIN g/dL 2 1* 2 0* 2 6*   TOTAL BILIRUBIN mg/dL 2 00* 2 50* 4 00*   BILIRUBIN DIRECT mg/dL  --   --  1 73*   AMMONIA umol/L  --   --  66*         Results from last 7 days   Lab Units 06/01/20  0611 05/31/20  0614 05/30/20  1202   GLUCOSE RANDOM mg/dL 86 118 109               Results from last 7 days   Lab Units 05/31/20  0614 05/30/20  1202   CK TOTAL U/L 1,993* 349*   CK MB INDEX % <1 0 <1 0   CK MB ng/mL 4 1 1 3     Results from last 7 days   Lab Units 05/31/20  0614 05/30/20  2216 05/30/20  1720 05/30/20  1517 05/30/20  1202   TROPONIN I ng/mL 0 13* 0 17* 0 19* 0 20* 0 16*         Results from last 7 days   Lab Units 05/30/20  1202   PROTIME seconds 18 8*   INR  1 57*   PTT seconds 36         Results from last 7 days   Lab Units 05/30/20  2216 05/30/20  1202   PROCALCITONIN ng/ml 10 52* 6 59*     Results from last 7 days   Lab Units 05/30/20  1719 05/30/20  1400 05/30/20  1202   LACTIC ACID mmol/L 2 0 3 5* 5 8*           Results from last 7 days Lab Units 05/30/20  1518   CLARITY UA  Slightly Cloudy   COLOR UA  Orange   SPEC GRAV UA  <=1 005   PH UA  6 0   GLUCOSE UA mg/dl Negative   KETONES UA mg/dl Negative   BLOOD UA  Large*   PROTEIN UA mg/dl Trace*   NITRITE UA  Positive*   BILIRUBIN UA  Negative   UROBILINOGEN UA E U /dl 1 0   LEUKOCYTES UA  Small*   WBC UA /hpf 4-10*   RBC UA /hpf 10-20*   BACTERIA UA /hpf Occasional   EPITHELIAL CELLS WET PREP /hpf Occasional     Results from last 7 days   Lab Units 05/30/20  2212   STREP PNEUMONIAE ANTIGEN, URINE  Negative   LEGIONELLA URINARY ANTIGEN  Negative           Results from last 7 days   Lab Units 05/30/20  1216 05/30/20  1202   BLOOD CULTURE  No Growth at 24 hrs   --    GRAM STAIN RESULT   --  Gram negative rods*               ED Treatment:   Medication Administration from 05/30/2020 1149 to 05/30/2020 1420       Date/Time Order Dose Route Action Comments     05/30/2020 1312 sodium chloride 0 9 % bolus 1,000 mL 0 mL Intravenous Stopped      05/30/2020 1204 sodium chloride 0 9 % bolus 1,000 mL 1,000 mL Intravenous New Bag      05/30/2020 1302 iohexol (OMNIPAQUE) 350 MG/ML injection (MULTI-DOSE) 100 mL 100 mL Intravenous Given      05/30/2020 1322 sodium chloride 0 9 % bolus 1,000 mL 1,000 mL Intravenous New Bag      05/30/2020 1414 ceftriaxone (ROCEPHIN) 1 g/50 mL in dextrose IVPB 0 mg Intravenous Stopped      05/30/2020 1354 ceftriaxone (ROCEPHIN) 1 g/50 mL in dextrose IVPB 1,000 mg Intravenous New Bag      05/30/2020 1354 sodium chloride 0 9 % bolus 1,250 mL 1,250 mL Intravenous New Bag      Present on Admission:  • Alcoholic cirrhosis (HCC)  • Hypertension      Admitting Diagnosis: Diarrhea [R19 7]  Cirrhosis (HCC) [K74 60]  Urinary tract infection [N39 0]  Acute kidney injury ([facility] [address] ) [N17 9]  Sepsis ([facility] ) [A41 9]  Age/Sex: 59 y o  male  Admission Orders:  Scheduled Medications:    Medications:  azithromycin 500 mg Oral Q24H   cefTRIAXone 1,000 mg Intravenous Q24H   nicotine 1 patch Transdermal Daily Continuous IV Infusions:    sodium chloride 125 mL/hr Intravenous Continuous     PRN Meds:    acetaminophen 650 mg Oral Q6H PRN       IP CONSULT TO UROLOGY    Network Utilization Review Department  [email or web address]  ATTENTION: Please call with any questions or concerns to [phone] and carefully listen to the prompts so that you are directed to the right person  All voicemails are confidential   Deandra Lawrence all requests for admission clinical reviews, approved or denied determinations and any other requests to dedicated fax number below belonging to the campus where the patient is receiving treatment   List of dedicated fax numbers for the Facilities:  FACILITY NAME UR FAX NUMBER   ADMISSION DENIALS (Administrative/Medical Necessity) [phone]   [address] [place] [address] (Maternity/NICU/Pediatrics) [phone]   [address] [facility] [phone]   [address] [phone]   [address] [phone]   [address] [facility] [phone]   [address] [facility] [phone]   [facility]  [phone] [address] [facility] [phone]   [address] [facility] [address] [facility] [phone]

## 2020-06-01 NOTE — ASSESSMENT & PLAN NOTE
· Follows with GI as an outpatient, last seen approximately 1 month ago  · No evidence of encephalopathy on exam, no asterixis noted  · LFT's stable  · CT abdomen pelvis showing moderate mesenteric stranding worse from previous CT study of unclear etiology and present for 1 year no evidence of thrombus patient with no acute abdominal symptoms outpatient follow-up with GI ambulatory referral upon discharge

## 2020-06-01 NOTE — ASSESSMENT & PLAN NOTE
· Likely secondary to underlying urinary tract infection  · Of note 1 of 2 blood cultures is positive for gram-negative rods continue to trend  · Again patient remains afebrile without leukocytosis  · A procalcitonin now and in a m    · No events on telemetry over 24 hours discontinued  · Troponin elevated on admission however have remained flat suspect elevated troponin in setting of UTI/sepsis and further monitoring this time and patient is asymptomatic of chest pain

## 2020-06-01 NOTE — ASSESSMENT & PLAN NOTE
· Non chest pain associated elevation in troponin  · Likely secondary to underlying urinary tract infection and acute kidney injury in setting of cirrhosis  · No further inpatient intervention indicated at this time  · Troponins elevated but flat  · No events over last 24 hours on telemetry can discontinue

## 2020-06-01 NOTE — SOCIAL WORK
LOS: 2 GMLOS: none    Pt is not a 30 day readmission and is not a bundle pt  Pt presented at ED w/ chief complaint of diarrhea  Pt admitted on 5/30/20 w/ Sepsis second to UTI  PT and OT Consulted    SW met w/ pt to complete assessment  Pt was alert and able to answer assessment questions  SW confirmed that pt's phone number is [phone] and that his daughter, Claudia Lares ([phone]) is his emergency contact and confirmed her phone number  Pt does not have any advanced directives  Pt is , retired, and receives SSI  Pt lives w/ his dtr in a ranch style home w/ [address], SW confirmed address ([address] [place] [address])  Pt stated that he was independent w/ all ADLs prior to presentation  Pt stated that he only used a SPC when going down stairs w/o hand railing  Pt does drive  Pt is not open w/ any in home or community resources  Pt does not have a PCP, but would like GRETTA to schedule a PCP appointment prior to d/c  Pt smokes 1 1/2- 2 packs of cigarettes a week  Pt is not a , does not have legal issues, no mental health hx, and no SNF hx  GRETTA confirmed that pt has [employer] rep  SW confirmed w/ pt that he has prescription coverage  Pt [facility] [address] [place]  When pt is d/c, pt stated that his daughter can transport home  Pt had no other questions or concerns  SW will follow for plan of care for discharge disposition and any d/c needs

## 2020-06-01 NOTE — CONSULTS
UROLOGY CONSULTATION NOTE     Patient Identifiers: Rigo Younger (MRN [id number])  Service Requesting Consultation:  Александр Herrera    Service Providing Consultation:  Urology, Orquidea Kapadia, Indu West    Date of Service: 6/1/2020  Consults    Reason for Consultation: UTI    ASSESSMENT:     59 y o  old male with  bacteremia presumed  etiology  No indication of chronic urinary retention at this time  Status post Arroyo catheter insertion  Acute kidney injury, pre renal--resolved     PLAN:   Continue medical optimization  Initiate/continue Flomax  Remove catheter in a m   Monitor PVRs closely prior to discharge  Office follow-up will be scheduled  Our service will contact patient/caregiver with appointment date and time once discharged  History of Present Illness:     Rigo Younger is a 59 y o  old male with history of alcoholic cirrhosis presenting to [facility] ER with fatigue, generalized weakness, dysuria and finding of acute kidney injury  Lactate was negative on lab to or with procalcitonin at 10 52  Urinalysis nitrite positive  Urine culture pending  However,  preliminary blood culture positive for 1 out of to gram-negative rods  Patient denies any baseline lower urinary tract symptoms such as urinary urgency, urinary frequency, urinary hesitancy, poor or weak urinary stream, nocturia, prior or current gross hematuria, scrotal/groin or testicular pain, flank or abdominal pain  He denies prior urologic consultation, evaluation or  surgical manipulation including for prostate or for nephrolithiasis  Catheter was inserted on admission for only 300 mL urinary output on urethral cannulation  Patient is afebrile without leukocytosis and resolved acute kidney injury with creatinine of 0 71 from 1 82  CT of the abdomen demonstrated mild bilateral perinephric stranding without upper tract obstruction  Urologic consultation requested for further management recommendations      Past Medical, Past Surgical History: Past Medical History:   Diagnosis Date   • Alcohol abuse    • Arthritis    • Cirrhosis ([facility] [address] )    • COPD (chronic obstructive pulmonary disease) (HCC)     states has it   • DVT of proximal lower limb (HCC)     left calf   • Hemochromatosis    • Psoriasis    • Varices, esophageal (HCC)    :    Past Surgical History:   Procedure Laterality Date   • CATARACT EXTRACTION     • COLONOSCOPY     • CYST REMOVAL     • JOINT REPLACEMENT Bilateral    • RHINOPLASTY     • TOTAL HIP ARTHROPLASTY     :    Medications, Allergies:     Current Facility-Administered Medications   Medication Dose Route Frequency   • acetaminophen (TYLENOL) tablet 650 mg  650 mg Oral Q6H PRN   • azithromycin (ZITHROMAX) tablet 500 mg  500 mg Oral Q24H   • cefTRIAXone (ROCEPHIN) 1,000 mg in dextrose 5 % 50 mL IVPB  1,000 mg Intravenous Q24H   • nicotine (NICODERM CQ) 7 mg/24hr TD 24 hr patch 1 patch  1 patch Transdermal Daily       Allergies: Allergies   Allergen Reactions   • Cat Hair Extract    :    Social and Family History:   Social History:   Social History     Tobacco Use   • Smoking status: Current Every Day Smoker     Packs/day: 0 00     Years: 50 00     Pack years: 0 00     Last attempt to quit: 2019     Years since quittin 9   • Smokeless tobacco: Never Used   Substance Use Topics   • Alcohol use: Not Currently     Frequency: 4 or more times a week     Drinks per session: 7 to 9   • Drug use: Not Currently         Social History     Tobacco Use   Smoking Status Current Every Day Smoker   • Packs/day: 0 00   • Years: 50 00   • Pack years: 0 00   • Last attempt to quit: 2019   • Years since quittin 9   Smokeless Tobacco Never Used       Family History:  Family History   Problem Relation Age of Onset   • Aneurysm Mother    • Alzheimer's disease Father    • Lung cancer Son    :     Review of Systems:     Review of Systems - General ROS: positive for  - fatigue and malaise  negative for - chills or fever  Respiratory ROS: no "cough, shortness of breath, or wheezing  Cardiovascular ROS: no chest pain or dyspnea on exertion  Gastrointestinal ROS: positive for - abdominal pain and diarrhea  Genito-Urinary ROS: no dysuria, trouble voiding, or hematuria  Neurological ROS: no TIA or stroke symptoms    All other systems queried were negative  Physical Exam:   General: Patient is pleasant and in NAD  Awake and alert  /82 (BP Location: Left arm)   Pulse 75   Temp 99 °F (37 2 °C) (Oral)   Resp 18   Ht 6' 3\" (1 905 m)   Wt 106 kg (233 lb 0 4 oz)   SpO2 94%   BMI 29 13 kg/m² Temp (24hrs), Av 3 °F (37 4 °C), Min:98 8 °F (37 1 °C), Max:99 8 °F (37 7 °C)  current; Temperature: 99 °F (37 2 °C)  I/O last 24 hours: In: 3365 7 [P O :600;  I V :2765 7]  Out: 2500 [Urine:2500]    General appearance: alert and oriented, in no acute distress, appears stated age, cooperative, no distress and pale  Head: Normocephalic, without obvious abnormality, atraumatic  Neck: no adenopathy, no carotid bruit, no JVD, supple, symmetrical, trachea midline and thyroid not enlarged, symmetric, no tenderness/mass/nodules  Lungs: diminished breath sounds  Heart: regular rate and rhythm, S1, S2 normal, no murmur, click, rub or gallop  Abdomen: soft, non-tender; bowel sounds normal; no masses,  no organomegaly  Extremities: extremities normal, warm and well-perfused; no cyanosis, clubbing, or edema  Pulses: 2+ and symmetric  Neurologic: Grossly normal  Arroyo patent for wilma urine    Labs:     Lab Results   Component Value Date    HGB 11 8 (L) 2020    HCT 33 6 (L) 2020    WBC 3 28 (L) 2020    PLT 61 (L) 2020   ]    Lab Results   Component Value Date    K 3 7 2020     2020    CO2 21 2020    BUN 15 2020    CREATININE 0 71 2020    CALCIUM 7 6 (L) 2020   ]    Imaging:   I personally reviewed the images and report of the following studies, and reviewed them with the patient:    CT Abdomen: See " below  CT abdomen pelvis with contrast [[id number]] Collected: 05/30/20 1312   Order Status: Completed Updated: 05/30/20 1328   Narrative:     CT ABDOMEN AND PELVIS WITH IV CONTRAST    INDICATION:   Abdominal pain distension, cirrhotic, diarrhea  Patient has suspected COVID-19  COMPARISON:  6/23/2019    TECHNIQUE:  CT examination of the abdomen and pelvis was performed   In addition to portal venous phase postcontrast scanning through the abdomen and pelvis, delayed phase postcontrast scanning was performed through the abdominal and pelvic viscera     Axial, sagittal, and coronal 2D reformatted images were created from the source data and submitted for interpretation  Radiation dose length product (DLP) for this visit:  2232 mGy-cm    This examination, like all CT scans performed in the [facility], was performed utilizing techniques to minimize radiation dose exposure, including the use of iterative   reconstruction and automated exposure control  IV Contrast:  100 mL of iohexol (OMNIPAQUE)  Enteric Contrast:  Enteric contrast was not administered  FINDINGS:    ABDOMEN    LOWER CHEST: Nikole Leep are esophageal varices  LIVER/BILIARY TREE:  The liver demonstrates cirrhotic morphology   Within the limitations of this examination there is no evidence of suspicious hepatic mass  There is a small cyst at the liver dome, stable  No biliary dilatation   Portal vein is patent  GALLBLADDER:  No calcified gallstones  There is mild wall thickening though this may be related to liver disease  No pericholecystic inflammatory change  SPLEEN:  The spleen is enlarged  PANCREAS:  Unremarkable  ADRENAL GLANDS:  Unremarkable  KIDNEYS/URETERS: Ivis Solo 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  There is nonspecific bilateral perinephric stranding  There is mild enhancement of the left ureteral wall  No hydronephrosis  STOMACH AND BOWEL:  Unremarkable      APPENDIX:  No findings to suggest appendicitis  ABDOMINOPELVIC CAVITY:  There is moderate stranding in the mesentery, increased compared to the prior study though of uncertain etiology  No ascites   No pneumoperitoneum   No lymphadenopathy  VESSELS:  Unremarkable for patient's age  PELVIS    Severely limited by beam hardening artifact  REPRODUCTIVE ORGANS:  Unremarkable for patient's age  URINARY BLADDER: Nguyễn Nickels is severe bladder wall thickening though evaluation is limited due to beam hardening artifact from bilateral hip arthroplasties  ABDOMINAL WALL/INGUINAL REGIONS:  Unremarkable  OSSEOUS STRUCTURES:  No acute fracture or destructive osseous lesion  There is a stable chronic compression deformity of L3  Impression:       1   Evidence of cystitis with marked bladder wall thickening with left ureteral wall enhancement suggesting ascending urinary tract infection  2   Moderate mesenteric stranding, worsened compared to the previous CT though of uncertain etiology  This may be related to an acute inflammatory process such as mesenteric right as versus venous congestion from portal hypertension  No vascular   thrombosis is visualized, however  3   Cirrhosis with portal hypertension  4   Gallbladder wall thickening likely on the basis of hepatocellular disease  The study was marked in [facility] for immediate notification  Workstation performed: [id number]             Thank you for allowing me to participate in this patients’ care  Please do not hesitate to call with any additional questions    BETTINA Durant

## 2020-06-01 NOTE — PROGRESS NOTES
Progress Note - Glennie Buerger 1955, 59 y o  male MRN: [id number]    Unit/Bed#: -01 Encounter: [id number]    Primary Care Provider: No primary care provider on file  Date and time admitted to hospital: 5/30/2020 11:51 AM        * Urinary tract infection  Assessment & Plan  · Urine culture positive for gram-negative rods  · Continue with ceftriaxone for now,   · follow-up urine culture final growth and sensitivity  · Of note patient is afebrile without leukocytosis  · Discussion with Urology this time will start Flomax this evening and voiding trial in a m  This well overall clear from Urology perspective discharge with outpatient follow-up    Sepsis [facility])  Assessment & Plan  · Likely secondary to underlying urinary tract infection  · Of note 1 of 2 blood cultures is positive for gram-negative rods continue to trend  · Again patient remains afebrile without leukocytosis  · A procalcitonin now and in a m  · No events on telemetry over 24 hours discontinued  · Troponin elevated on admission however have remained flat suspect elevated troponin in setting of UTI/sepsis and further monitoring this time and patient is asymptomatic of chest pain    Acute kidney injury ([facility] [address] )  Assessment & Plan  · Likely secondary to volume loss from profuse diarrhea  · Creatinine now much improved, ANGELES resolved status post IV hydration  · Continue to monitor     Dysuria  Assessment & Plan  · Plan as mentioned above    Hyperammonemia (HCC)  Assessment & Plan  · Noted elevated ammonia level of 66  · Patient does not have impaired mentation at this time  · continue to monitor      Hypertension  Assessment & Plan  · Blood Pressure stable, monitor daily    Alcoholic cirrhosis (HCC)  Assessment & Plan  · Follows with GI as an outpatient, last seen approximately 1 month ago  · No evidence of encephalopathy on exam, no asterixis noted  · LFT's stable  · CT abdomen pelvis showing moderate mesenteric stranding worse from previous CT study of unclear etiology and present for 1 year no evidence of thrombus patient with no acute abdominal symptoms outpatient follow-up with GI ambulatory referral upon discharge    Hyperbilirubinemia  Assessment & Plan  · Secondary to underlying cirrhosis   · Monitor daily    Type 2 MI (myocardial infarction) ([address] )  Assessment & Plan  · Non chest pain associated elevation in troponin  · Likely secondary to underlying urinary tract infection and acute kidney injury in setting of cirrhosis  · No further inpatient intervention indicated at this time  · Troponins elevated but flat  · No events over last 24 hours on telemetry can discontinue        VTE Pharmacologic Prophylaxis:   Pharmacologic: Pharmacologic VTE Prophylaxis contraindicated due to Thrombocytopenia  Mechanical VTE Prophylaxis in Place: Yes    Patient Centered Rounds: I have performed bedside rounds with nursing staff today  Discussions with Specialists or Other Care Team Provider:  Urology    Education and Discussions with Family / Patient:  Patient    Time Spent for Care: 30 minutes  More than 50% of total time spent on counseling and coordination of care as described above  Current Length of Stay: 2 day(s)    Current Patient Status: Inpatient   Certification Statement: The patient will continue to require additional inpatient hospital stay due to Ongoing treatment and sitting cystitis/sepsis    Discharge Plan:  Pending progress anticipate discharge in next 48 hours    Code Status: Level 1 - Full Code      Subjective:   Patient reports ongoing generalized weakness limited strength  Patient reports shortness of breath and he has not been able to take a deep breath and states why he was started on antibiotics  Patient was hoping to get Respiratory treatment which would be reasonable    Patient denies fevers or chills discussion with Urology at the bedside about taking the Arroyo tomorrow starting medication tonight patient agreeable is successful outpatient follow-up  Objective:     Vitals:   Temp (24hrs), Av 3 °F (37 4 °C), Min:98 8 °F (37 1 °C), Max:99 8 °F (37 7 °C)    Temp:  [98 8 °F (37 1 °C)-99 8 °F (37 7 °C)] 99 °F (37 2 °C)  HR:  [75-82] 75  Resp:  [18] 18  BP: (114-144)/(61-85) 141/82  SpO2:  [94 %-98 %] 94 %  Body mass index is 29 13 kg/m²  Input and Output Summary (last 24 hours): Intake/Output Summary (Last 24 hours) at 2020 1007  Last data filed at 2020 0500  Gross per 24 hour   Intake 1359 ml   Output 2500 ml   Net -1141 ml       Physical Exam:     Physical Exam   Constitutional: He is oriented to person, place, and time  HENT:   Head: Normocephalic and atraumatic  Eyes: Conjunctivae and EOM are normal    Neck: Normal range of motion  Cardiovascular: Normal rate, regular rhythm and normal heart sounds  Pulmonary/Chest: Effort normal  He has wheezes in the right upper field and the left upper field  Abdominal: Soft  Bowel sounds are normal    Musculoskeletal: Normal range of motion  Neurological: He is alert and oriented to person, place, and time  Skin: Skin is warm and dry  Psychiatric: He has a normal mood and affect   His behavior is normal          Additional Data:     Labs:    Results from last 7 days   Lab Units 20  0611   WBC Thousand/uL 3 28*   HEMOGLOBIN g/dL 11 8*   HEMATOCRIT % 33 6*   PLATELETS Thousands/uL 61*   NEUTROS PCT % 38*   LYMPHS PCT % 38   MONOS PCT % 17*   EOS PCT % 4     Results from last 7 days   Lab Units 20  0611   SODIUM mmol/L 135*   POTASSIUM mmol/L 3 7   CHLORIDE mmol/L 105   CO2 mmol/L 21   BUN mg/dL 15   CREATININE mg/dL 0 71   ANION GAP mmol/L 9   CALCIUM mg/dL 7 6*   ALBUMIN g/dL 2 1*   TOTAL BILIRUBIN mg/dL 2 00*   ALK PHOS U/L 62   ALT U/L 58   AST U/L 152*   GLUCOSE RANDOM mg/dL 86     Results from last 7 days   Lab Units 20  1202   INR  1 57*             Results from last 7 days   Lab Units 20  2216 20  1719 20  1400 05/30/20  1202   LACTIC ACID mmol/L  --  2 0 3 5* 5 8*   PROCALCITONIN ng/ml 10 52*  --   --  6 59*           * I Have Reviewed All Lab Data Listed Above  * Additional Pertinent Lab Tests Reviewed: Julia 66 Admission Reviewed    Imaging:    Imaging Reports Reviewed Today Include:  As mentioned above      Recent Cultures (last 7 days):     Results from last 7 days   Lab Units 05/30/20  2212 05/30/20  1216 05/30/20  1202   BLOOD CULTURE   --  No Growth at 24 hrs   --    GRAM STAIN RESULT   --   --  Gram negative rods*   LEGIONELLA URINARY ANTIGEN  Negative  --   --        Last 24 Hours Medication List:     Current Facility-Administered Medications:  acetaminophen 650 mg Oral Q6H PRN Steve Pike MD    albuterol 2 5 mg Nebulization Q6H PRN BETTINA Cartagena    azithromycin 500 mg Oral Q24H Pearle Sever, CRNP    cefTRIAXone 1,000 mg Intravenous Q24H Mario Conner MD Last Rate: 1,000 mg (05/31/20 1458)   nicotine 1 patch Transdermal Daily Mario Conner MD         Today, Patient Was Seen By: BETTINA Cartagena    ** Please Note: Dictation voice to text software may have been used in the creation of this document   **

## 2020-06-01 NOTE — PLAN OF CARE
Problem: DISCHARGE PLANNING - CARE MANAGEMENT  Goal: Discharge to post-acute care or home with appropriate resources  Description  INTERVENTIONS:  - Conduct assessment to determine patient/family and health care team treatment goals, and need for post-acute services based on payer coverage, community resources, and patient preferences, and barriers to discharge  - Address psychosocial, clinical, and financial barriers to discharge as identified in assessment in conjunction with the patient/family and health care team  - Arrange appropriate level of post-acute services according to patient’s   needs and preference and payer coverage in collaboration with the physician and health care team  - Communicate with and update the patient/family, physician, and health care team regarding progress on the discharge plan  - Arrange appropriate transportation to post-acute venues  Outcome: Progressing  - Social Work will follow for plan of care for any PT and OT needs and discharge disposition

## 2020-06-02 ENCOUNTER — TELEPHONE (OUTPATIENT)
Dept: OTHER | Facility: HOSPITAL | Age: 65
End: 2020-06-02

## 2020-06-02 LAB
ALBUMIN SERPL BCP-MCNC: 2.2 G/DL (ref 3.5–5)
ALP SERPL-CCNC: 66 U/L (ref 46–116)
ALT SERPL W P-5'-P-CCNC: 60 U/L (ref 12–78)
ANION GAP SERPL CALCULATED.3IONS-SCNC: 8 MMOL/L (ref 4–13)
AST SERPL W P-5'-P-CCNC: 139 U/L (ref 5–45)
BACTERIA UR CULT: ABNORMAL
BACTERIA UR CULT: ABNORMAL
BASOPHILS # BLD MANUAL: 0 THOUSAND/UL (ref 0–0.1)
BASOPHILS NFR MAR MANUAL: 0 % (ref 0–1)
BILIRUB SERPL-MCNC: 2 MG/DL (ref 0.2–1)
BUN SERPL-MCNC: 11 MG/DL (ref 5–25)
CALCIUM SERPL-MCNC: 7.7 MG/DL (ref 8.3–10.1)
CHLORIDE SERPL-SCNC: 106 MMOL/L (ref 100–108)
CK MB SERPL-MCNC: 1.6 NG/ML (ref 0–5)
CK MB SERPL-MCNC: <1 % (ref 0–2.5)
CK SERPL-CCNC: 673 U/L (ref 39–308)
CO2 SERPL-SCNC: 23 MMOL/L (ref 21–32)
CREAT SERPL-MCNC: 0.61 MG/DL (ref 0.6–1.3)
EOSINOPHIL # BLD MANUAL: 0.14 THOUSAND/UL (ref 0–0.4)
EOSINOPHIL NFR BLD MANUAL: 5 % (ref 0–6)
ERYTHROCYTE [DISTWIDTH] IN BLOOD BY AUTOMATED COUNT: 14.8 % (ref 11.6–15.1)
GFR SERPL CREATININE-BSD FRML MDRD: 106 ML/MIN/1.73SQ M
GLUCOSE SERPL-MCNC: 89 MG/DL (ref 65–140)
HCT VFR BLD AUTO: 33.6 % (ref 36.5–49.3)
HGB BLD-MCNC: 11.7 G/DL (ref 12–17)
LYMPHOCYTES # BLD AUTO: 1.28 THOUSAND/UL (ref 0.6–4.47)
LYMPHOCYTES # BLD AUTO: 45 % (ref 14–44)
MAGNESIUM SERPL-MCNC: 1.5 MG/DL (ref 1.6–2.6)
MCH RBC QN AUTO: 37.3 PG (ref 26.8–34.3)
MCHC RBC AUTO-ENTMCNC: 34.8 G/DL (ref 31.4–37.4)
MCV RBC AUTO: 107 FL (ref 82–98)
MONOCYTES # BLD AUTO: 0.23 THOUSAND/UL (ref 0–1.22)
MONOCYTES NFR BLD: 8 % (ref 4–12)
NEUTROPHILS # BLD MANUAL: 1.17 THOUSAND/UL (ref 1.85–7.62)
NEUTS BAND NFR BLD MANUAL: 2 % (ref 0–8)
NEUTS SEG NFR BLD AUTO: 39 % (ref 43–75)
NRBC BLD AUTO-RTO: 0 /100 WBCS
PLATELET # BLD AUTO: 57 THOUSANDS/UL (ref 149–390)
PLATELET BLD QL SMEAR: ABNORMAL
PMV BLD AUTO: 10 FL (ref 8.9–12.7)
POTASSIUM SERPL-SCNC: 3.7 MMOL/L (ref 3.5–5.3)
PROCALCITONIN SERPL-MCNC: 2.37 NG/ML
PROT SERPL-MCNC: 5.8 G/DL (ref 6.4–8.2)
RBC # BLD AUTO: 3.14 MILLION/UL (ref 3.88–5.62)
SODIUM SERPL-SCNC: 137 MMOL/L (ref 136–145)
TOTAL CELLS COUNTED SPEC: 100
VARIANT LYMPHS # BLD AUTO: 1 %
WBC # BLD AUTO: 2.85 THOUSAND/UL (ref 4.31–10.16)

## 2020-06-02 PROCEDURE — 85027 COMPLETE CBC AUTOMATED: CPT | Performed by: STUDENT IN AN ORGANIZED HEALTH CARE EDUCATION/TRAINING PROGRAM

## 2020-06-02 PROCEDURE — 97163 PT EVAL HIGH COMPLEX 45 MIN: CPT

## 2020-06-02 PROCEDURE — 84145 PROCALCITONIN (PCT): CPT | Performed by: NURSE PRACTITIONER

## 2020-06-02 PROCEDURE — 83735 ASSAY OF MAGNESIUM: CPT | Performed by: STUDENT IN AN ORGANIZED HEALTH CARE EDUCATION/TRAINING PROGRAM

## 2020-06-02 PROCEDURE — 82550 ASSAY OF CK (CPK): CPT | Performed by: NURSE PRACTITIONER

## 2020-06-02 PROCEDURE — 99232 SBSQ HOSP IP/OBS MODERATE 35: CPT | Performed by: NURSE PRACTITIONER

## 2020-06-02 PROCEDURE — 80053 COMPREHEN METABOLIC PANEL: CPT | Performed by: STUDENT IN AN ORGANIZED HEALTH CARE EDUCATION/TRAINING PROGRAM

## 2020-06-02 PROCEDURE — 85007 BL SMEAR W/DIFF WBC COUNT: CPT | Performed by: STUDENT IN AN ORGANIZED HEALTH CARE EDUCATION/TRAINING PROGRAM

## 2020-06-02 PROCEDURE — 97166 OT EVAL MOD COMPLEX 45 MIN: CPT

## 2020-06-02 PROCEDURE — 82553 CREATINE MB FRACTION: CPT | Performed by: NURSE PRACTITIONER

## 2020-06-02 RX ADMIN — CEFTRIAXONE SODIUM 1000 MG: 10 INJECTION, POWDER, FOR SOLUTION INTRAVENOUS at 13:47

## 2020-06-02 RX ADMIN — AZITHROMYCIN 500 MG: 500 TABLET, FILM COATED ORAL at 20:34

## 2020-06-02 RX ADMIN — TAMSULOSIN HYDROCHLORIDE 0.4 MG: 0.4 CAPSULE ORAL at 16:30

## 2020-06-02 RX ADMIN — MAGNESIUM GLUCONATE 500 MG ORAL TABLET 400 MG: 500 TABLET ORAL at 12:01

## 2020-06-02 RX ADMIN — MAGNESIUM GLUCONATE 500 MG ORAL TABLET 400 MG: 500 TABLET ORAL at 17:48

## 2020-06-02 RX ADMIN — NICOTINE 1 PATCH: 7 PATCH TRANSDERMAL at 08:00

## 2020-06-02 NOTE — ASSESSMENT & PLAN NOTE
· Urine culture positive showing growth of E coli  · Continue with ceftriaxone for now, can transition to oral antibiotic in next 24 hours  · follow-up urine culture final growth and sensitivity  · Of note patient is afebrile without leukocytosis  · Discussion with Urology this time patient Flomax this evening and voiding trial in a m   This well overall clear from Urology perspective discharge with outpatient follow-up  · Patient attempted on voiding trial today and had 51 mL PVR  · Patient overall can discharge from Urology perspective  · PT OT evaluation completed recommending short-term rehab case management to work on discharge

## 2020-06-02 NOTE — PROGRESS NOTES
"Progress Note - El Gallo 59 y o  male MRN: [id number]    Unit/Bed#: -01 Encounter: [id number]      Assessment:  El Gallo is a 70-year-old male seen in consultation for presumed urinary retention and bacteremia of  etiology  Preliminary blood culture positive for negative g rods with low level E coli on urine culture  Arroyo catheter was inserted on admission for 300 mL  Since then, alpha blockade is initiated  Patient has expressed improvement with symptoms of anorexia and lethargy  He remains afebrile and hemodynamically stable  He is without acute kidney injury or leukocytosis  Plan: Arroyo catheter removed this a m  Kalia Shine Patient voided 500 mL with minimal PVR  Continue Flomax  No requirement for  intervention this admission  Follow-up as an outpatient  Will sign off  Subjective:   Denies fever, chills, flank, abdominal, suprapubic or groin pain  Objective:     Vitals: Blood pressure 137/73, pulse 83, temperature 98 5 °F (36 9 °C), resp  rate 18, height 6' 3\" (1 905 m), weight 106 kg (233 lb 0 4 oz), SpO2 95 %  ,Body mass index is 29 13 kg/m²        Intake/Output Summary (Last 24 hours) at 6/2/2020 1200  Last data filed at 6/2/2020 1129  Gross per 24 hour   Intake 1980 ml   Output 3350 ml   Net -1370 ml       Physical Exam: General appearance: alert and oriented, in no acute distress, appears stated age, cooperative and no distress  Head: Normocephalic, without obvious abnormality, atraumatic  Neck: no adenopathy, no carotid bruit, no JVD, supple, symmetrical, trachea midline and thyroid not enlarged, symmetric, no tenderness/mass/nodules  Lungs: clear to auscultation bilaterally  Heart: regular rate and rhythm, S1, S2 normal, no murmur, click, rub or gallop  Abdomen: soft, non-tender; bowel sounds normal; no masses,  no organomegaly  Extremities: extremities normal, warm and well-perfused; no cyanosis, clubbing, or edema  Pulses: 2+ and symmetric  Neurologic: Grossly normal  No " urinary drains     Invasive Devices     Peripheral Intravenous Line            Peripheral IV 05/30/20 Right Antecubital 2 days              Lab Results   Component Value Date    WBC 2 85 (L) 06/02/2020    HGB 11 7 (L) 06/02/2020    HCT 33 6 (L) 06/02/2020     (H) 06/02/2020    PLT 57 (L) 06/02/2020     Lab Results   Component Value Date    SODIUM 137 06/02/2020    K 3 7 06/02/2020     06/02/2020    CO2 23 06/02/2020    BUN 11 06/02/2020    CREATININE 0 61 06/02/2020    GLUC 89 06/02/2020    CALCIUM 7 7 (L) 06/02/2020       Lab, Imaging and other studies: I have personally reviewed pertinent reports

## 2020-06-02 NOTE — OCCUPATIONAL THERAPY NOTE
Occupational Therapy Evaluation Note        Patient Name: Atul Ornelas Date: 6/2/2020 06/02/20 6875   Note Type   Note type Eval only   Restrictions/Precautions   Weight Bearing Precautions Per Order No   Other Precautions Cognitive; Chair Alarm; Bed Alarm; Fall Risk;Pain   Pain Assessment   Pain Assessment Tool 0-10   Pain Score 2  (Mild back pain with mobility)   Home Living   Type of [address] Other (Comment); One level;Performs ADLs on one level; Able to live on main level with bedroom/bathroom  (4 RUTH ANN)   Bathroom Shower/Tub Tub/shower unit   [employer] Other (Comment)  (none at baseline)   [address]; Other (Comment)  (uses cane at baseline)   Prior Function   Level of [place] Independent with ADLs and functional mobility   Lives With Friend(s)   Receives Help From Family;Friend(s)   ADL Assistance Independent   IADLs Needs assistance   Falls in the last 6 months 0   Vocational Retired   Comments pt drives   Lifestyle   Autonomy Patient reports that at baseline, he is independent in ADLs and receives help for IADLs  Patient reports that he has been living with his friend, friend's wife and friend's mother-in-law, whom he helps to take care of  Patient reports that he has primarily been ambulating at home and has not been participating in community mobility     Reciprocal Relationships Supportive friends   Service to Others Retired    ADL   Eating Assistance 6  Modified independent   [facility] 5  [address] [place] [address]  3  Moderate Assistance   [address] 3  Moderate [address] [facility] 3  Moderate Assistance   Additional Comments ADL assist levels based on pt's functional performance during evaluation   Bed Mobility Rolling R 4  Minimal assistance   Additional items Assist x 1; Increased time required;Verbal cues  (Log roll technique)   Supine to Sit 4  Minimal assistance   Additional items Assist x 2; Increased time required;Verbal cues   Sit to Supine 4  Minimal assistance   Additional items Assist x 2; Increased time required;Verbal cues   Additional Comments Pt received lying supine in bed upon OT arrival; at end of session: pt seated OOB to recliner chair with all needs within reach and chair alarm activated   Transfers   Sit to Stand 4  Minimal assistance   Additional items Assist x 2; Increased time required;Verbal cues   Stand to Sit 4  Minimal assistance   Additional items Assist x 2; Increased time required;Verbal cues   Additional Comments Performed w/ RW   Functional Mobility   Functional Mobility 4  Minimal assistance   Additional items Rolling walker   Balance   Static Sitting Fair   Dynamic Sitting Fair -   Static Standing Fair -   Dynamic Standing Poor +   Ambulatory Poor +   Activity Tolerance   Activity Tolerance Patient limited by fatigue;Patient limited by pain   Medical Staff Made Aware PT Lucien Pendleton   Nurse Made Aware JAZZ Sosa confirmed pt appropriate for therapy   RUE Assessment   RUE Assessment X   RUE Strength   R Shoulder Flexion 4/5   R Elbow Flexion 3/5   R Elbow Extension 3/5   LUE Assessment   LUE Assessment X   LUE Strength   L Shoulder Flexion 4/5   L Elbow Flexion 3/5   L Elbow Extension 3/5   Hand Function   Gross Motor Coordination Functional   Fine Motor Coordination Functional   Sensation   Light Touch No apparent deficits   Cognition   Overall Cognitive Status Impaired   Arousal/Participation Responsive; Alert   Attention Within functional limits   Orientation Level Oriented X4   Memory Within functional limits   Following Commands Follows one step commands without difficulty   Comments Pt agreeable to OT evaluation   Assessment   Limitation Decreased ADL status; Decreased UE strength;Decreased Safe judgement during ADL;Decreased endurance;Decreased self-care trans;Decreased high-level ADLs   Prognosis Good   Assessment Patient is a 59 y o  male seen for OT evaluation s/p admit to [id number] [place] [address] on 5/30/2020 w/Urinary tract infection  Commorbidities affecting patient's functional performance at time of assessment include: sepsis, acute kidney injury, dysuria, hyperammonemia, HTN, alcoholic cirrhosis, hyperbilirubinemia, type 2 MI  Orders placed for OT evaluation and treatment  Performed at least two patient identifiers during session including name and wristband  Prior to admission, patient reports that he was living with his friend, friend's wife, and friend's mother-in-law in a one-level home with 4 [address]UTH [address]  Patient reports that prior to admission he was independent in ADLs and required assistance for IADLs  Personal factors affecting patient at time of initial evaluation include: limited caregiver support, steps to enter, flat affect, decreased initiation and engagement, difficulty performing ADLs and difficulty performing IADLs  Upon evaluation, patient requires supervision and minimal  assist for UB ADLs, moderate assist for LB ADLs, transfers and functional ambulation in room and bathroom with minimal  assist, with the use of Rolling Walker  Occupational performance is affected by the following deficits: flat affect, decreased muscle strength, dynamic sit/ stand balance deficit with poor standing tolerance time for self care and functional mobility, decreased activity tolerance and postural control and postural alignment deficit, requiring external assistance to complete transitional movements  Therapist completed expanded review of medical records and additional review of physical, cognitive or psychosocial history, clinical examination identifying 3-5 performance deficits, clinical decision making of a moderate complexity, consistent with moderate complexity level evaluation   Patient to benefit from continued Occupational Therapy treatment while in the hospital to address deficits as defined above and maximize level of functional independence with ADLs and functional mobility  Occupational Performance areas to address include: grooming , bathing/ shower, dressing, toilet hygiene, transfer to all surfaces, functional mobility, community mobility, medication routine/ management, IADLs: safety procedures, IADLs: meal prep/ clean up, Leisure Participation and Social participation  From OT standpoint, recommendation at time of d/c would be post-acute rehabilitation services  Goals   Patient Goals to get stronger to walk and ride his motorcycle again   Plan   Treatment Interventions ADL retraining;Functional transfer training;UE strengthening/ROM; Endurance training;Patient/family training;Equipment evaluation/education; Compensatory technique education; Energy conservation; Activityengagement   Goal Expiration Date 06/16/20   OT Frequency 3-5x/wk   Recommendation   OT Discharge Recommendation Post-Acute Rehabilitation Services   Barthel Index   Feeding 10   Bathing 0   Grooming Score 5   Dressing Score 5   Bladder Score 10   Bowels Score 10   Toilet Use Score 5   Transfers (Bed/Chair) Score 5   Mobility (Level Surface) Score 0   Stairs Score 0   Barthel Index Score 50   Modified Jeffrey Scale   Modified Palmer Scale 4     Occupational Therapy Goals:    1- Patient will verbalize and demonstrate use of energy conservation/ deep breathing technique and work simplification skills during functional activity with no verbal cues  2- Patient will verbalize and demonstrate good body mechanics and joint protection techniques during ADLs/ IADLs with no verbal cues  3- Patient will increase OOB/ sitting tolerance to 4-6 hours per day for increased participation in self care and leisure tasks with no s/s of exertion      4- Patient will identify s/s of exertion during ADL and functional mobility with no verbal cues     5-Patient will verbalize/ demonstrate compensatory strategies to recover from exertion with no verbal cues  6-Patient will increase standing tolerance time to 10 minutes with No UE support to complete sink level ADLs @ Mod I level  7- Patient will increase sitting tolerance at edge of bed to 30 minutes to complete UB ADLs @ Indep  level  8- Patient/ Family will demonstrate competency with UE Home Exercise Program      9- Patient will complete IADL tasks at Mod I level  10- Patient  will engage in activity configuration activity with good participation and mod I to increase time management skills and improve participation in a structured routine to improve overall quality of life  11- Pt will improve dynamic sitting balance to good to increase safety and independence during functional transfers and ADL and decrease risk for falls      Jayesh Harris, OTR/L

## 2020-06-02 NOTE — ASSESSMENT & PLAN NOTE
· Non chest pain associated elevation in troponin  · Likely secondary to underlying urinary tract infection and acute kidney injury in setting of cirrhosis  · No further inpatient intervention indicated at this time  · Troponins elevated but flat suspect non MI elevated troponin  · Telemetry has since been discontinued

## 2020-06-02 NOTE — PLAN OF CARE
Problem: OCCUPATIONAL THERAPY ADULT  Goal: Performs self-care activities at highest level of function for planned discharge setting  See evaluation for individualized goals  Description  Treatment Interventions: ADL retraining, Functional transfer training, UE strengthening/ROM, Endurance training, Patient/family training, Equipment evaluation/education, Compensatory technique education, Energy conservation, Activityengagement          See flowsheet documentation for full assessment, interventions and recommendations  Note:   Limitation: Decreased ADL status, Decreased UE strength, Decreased Safe judgement during ADL, Decreased endurance, Decreased self-care trans, Decreased high-level ADLs  Prognosis: Good  Assessment: Patient is a 59 y o  male seen for OT evaluation s/p admit to [address] [facility] on 5/30/2020 w/Urinary tract infection  Commorbidities affecting patient's functional performance at time of assessment include: sepsis, acute kidney injury, dysuria, hyperammonemia, HTN, alcoholic cirrhosis, hyperbilirubinemia, type 2 MI  Orders placed for OT evaluation and treatment  Performed at least two patient identifiers during session including name and wristband  Prior to admission, patient reports that he was living with his friend, friend's wife, and friend's mother-in-law in a one-level home with 4 [address]  Patient reports that prior to admission he was independent in ADLs and required assistance for IADLs  Personal factors affecting patient at time of initial evaluation include: limited caregiver support, steps to enter, flat affect, decreased initiation and engagement, difficulty performing ADLs and difficulty performing IADLs  Upon evaluation, patient requires supervision and minimal  assist for UB ADLs, moderate assist for LB ADLs, transfers and functional ambulation in room and bathroom with minimal  assist, with the use of Rolling Walker   Occupational performance is affected by the following deficits: flat affect, decreased muscle strength, dynamic sit/ stand balance deficit with poor standing tolerance time for self care and functional mobility, decreased activity tolerance and postural control and postural alignment deficit, requiring external assistance to complete transitional movements  Therapist completed expanded review of medical records and additional review of physical, cognitive or psychosocial history, clinical examination identifying 3-5 performance deficits, clinical decision making of a moderate complexity, consistent with moderate complexity level evaluation  Patient to benefit from continued Occupational Therapy treatment while in the hospital to address deficits as defined above and maximize level of functional independence with ADLs and functional mobility  Occupational Performance areas to address include: grooming , bathing/ shower, dressing, toilet hygiene, transfer to all surfaces, functional mobility, community mobility, medication routine/ management, IADLs: safety procedures, IADLs: meal prep/ clean up, Leisure Participation and Social participation  From OT standpoint, recommendation at time of d/c would be post-acute rehabilitation services         OT Discharge Recommendation: Post-Acute Rehabilitation Services

## 2020-06-02 NOTE — PLAN OF CARE
Problem: Potential for Falls  Goal: Patient will remain free of falls  Description  INTERVENTIONS:  - Assess patient frequently for physical needs  -  Identify cognitive and physical deficits and behaviors that affect risk of falls    -  [place] fall precautions as indicated by assessment   - Educate patient/family on patient safety including physical limitations  - Instruct patient to call for assistance with activity based on assessment  - Modify environment to reduce risk of injury  - Consider OT/PT consult to assist with strengthening/mobility  Outcome: Progressing     Problem: Prexisting or High Potential for Compromised Skin Integrity  Goal: Skin integrity is maintained or improved  Description  INTERVENTIONS:  - Identify patients at risk for skin breakdown  - Assess and monitor skin integrity  - Assess and monitor nutrition and hydration status  - Monitor labs   - Assess for incontinence   - Turn and reposition patient  - Assist with mobility/ambulation  - Relieve pressure over bony prominences  - Avoid friction and shearing  - Provide appropriate hygiene as needed including keeping skin clean and dry  - Evaluate need for skin moisturizer/barrier cream  - Collaborate with interdisciplinary team   - Patient/family teaching  - Consider wound care consult   Outcome: Progressing     Problem: PAIN - ADULT  Goal: Verbalizes/displays adequate comfort level or baseline comfort level  Description  Interventions:  - Encourage patient to monitor pain and request assistance  - Assess pain using appropriate pain scale  - Administer analgesics based on type and severity of pain and evaluate response  - Implement non-pharmacological measures as appropriate and evaluate response  - Consider cultural and social influences on pain and pain management  - Notify physician/advanced practitioner if interventions unsuccessful or patient reports new pain  Outcome: Progressing     Problem: DISCHARGE PLANNING  Goal: Discharge to home or other facility with appropriate resources  Description  INTERVENTIONS:  - Identify barriers to discharge w/patient and caregiver  - Arrange for needed discharge resources and transportation as appropriate  - Identify discharge learning needs (meds, wound care, etc )  - Arrange for interpretive services to assist at discharge as needed  - Refer to Case Management Department for coordinating discharge planning if the patient needs post-hospital services based on physician/advanced practitioner order or complex needs related to functional status, cognitive ability, or social support system  Outcome: Progressing     Problem: Knowledge Deficit  Goal: Patient/family/caregiver demonstrates understanding of disease process, treatment plan, medications, and discharge instructions  Description  Complete learning assessment and assess knowledge base    Interventions:  - Provide teaching at level of understanding  - Provide teaching via preferred learning methods  Outcome: Progressing     Problem: GASTROINTESTINAL - ADULT  Goal: Maintains or returns to baseline bowel function  Description  INTERVENTIONS:  - Assess bowel function  - Encourage oral fluids to ensure adequate hydration  - Administer IV fluids if ordered to ensure adequate hydration  - Administer ordered medications as needed  - Encourage mobilization and activity  - Consider nutritional services referral to assist patient with adequate nutrition and appropriate food choices  Outcome: Progressing  Goal: Maintains adequate nutritional intake  Description  INTERVENTIONS:  - Monitor percentage of each meal consumed  - Identify factors contributing to decreased intake, treat as appropriate  - Assist with meals as needed  - Monitor I&O, weight, and lab values if indicated  - Obtain nutrition services referral as needed  Outcome: Progressing     Problem: GENITOURINARY - ADULT  Goal: Maintains or returns to baseline urinary function  Description  INTERVENTIONS:  - Assess urinary function  - Encourage oral fluids to ensure adequate hydration if ordered  - Administer IV fluids as ordered to ensure adequate hydration  - Administer ordered medications as needed  - Offer frequent toileting  - Follow urinary retention protocol if ordered  Outcome: Progressing  Goal: Absence of urinary retention  Description  INTERVENTIONS:  - Assess patient’s ability to void and empty bladder  - Monitor I/O  - Bladder scan as needed  - Discuss with physician/AP medications to alleviate retention as needed  - Discuss catheterization for long term situations as appropriate  Outcome: Progressing  Goal: Urinary catheter remains patent  Description  INTERVENTIONS:  - Assess patency of urinary catheter  - If patient has a chronic rivera, consider changing catheter if non-functioning  - Follow guidelines for intermittent irrigation of non-functioning urinary catheter  Outcome: Progressing     Problem: METABOLIC, FLUID AND ELECTROLYTES - ADULT  Goal: Fluid balance maintained  Description  INTERVENTIONS:  - Monitor labs   - Monitor I/O and WT  - Instruct patient on fluid and nutrition as appropriate  - Assess for signs & symptoms of volume excess or deficit  Outcome: Progressing     Problem: SKIN/TISSUE INTEGRITY - ADULT  Goal: Skin integrity remains intact  Description  INTERVENTIONS  - Identify patients at risk for skin breakdown  - Assess and monitor skin integrity  - Assess and monitor nutrition and hydration status  - Monitor labs (i e  albumin)  - Assess for incontinence   - Turn and reposition patient  - Assist with mobility/ambulation  - Relieve pressure over bony prominences  - Avoid friction and shearing  - Provide appropriate hygiene as needed including keeping skin clean and dry  - Evaluate need for skin moisturizer/barrier cream  - Collaborate with interdisciplinary team (i e  Nutrition, Rehabilitation, etc )   - Patient/family teaching  Outcome: Progressing     Problem: DISCHARGE PLANNING - CARE MANAGEMENT  Goal: Discharge to post-acute care or home with appropriate resources  Description  INTERVENTIONS:  - Conduct assessment to determine patient/family and health care team treatment goals, and need for post-acute services based on payer coverage, community resources, and patient preferences, and barriers to discharge  - Address psychosocial, clinical, and financial barriers to discharge as identified in assessment in conjunction with the patient/family and health care team  - Arrange appropriate level of post-acute services according to patient’s   needs and preference and payer coverage in collaboration with the physician and health care team  - Communicate with and update the patient/family, physician, and health care team regarding progress on the discharge plan  - Arrange appropriate transportation to post-acute venues  Outcome: Progressing

## 2020-06-02 NOTE — ASSESSMENT & PLAN NOTE
· Likely secondary to underlying urinary tract infection  · Of note 1 of 2 blood cultures is positive question contamination 2nd blood culture remains negative at 48 hours  · Again patient remains afebrile without leukocytosis  · Patient is procalcitonin peaked tendon is currently at 2 37 would continue antibiotic  · No events on telemetry over 24 hours telemetry has since been discontinued  · Troponin elevated on admission however have remained flat suspect elevated troponin in setting of UTI/sepsis and further monitoring this time and patient is asymptomatic of chest pain

## 2020-06-02 NOTE — PHYSICAL THERAPY NOTE
PT Initial Evaluation  Physical Therapy Evaluation     Patient's Name: Iraida Dye    Admitting Diagnosis  Diarrhea [R19 7]  Cirrhosis ([facility] ) [K74 60]  Urinary tract infection [N39 0]  Acute kidney injury ([facility] ) [N17 9]  Sepsis ([facility] ) [A41 9]    Problem List  Patient Active Problem List   Diagnosis   • Hypertension   • Alcoholic cirrhosis ([facility] )   • Urinary tract infection   • Sepsis ([facility] )   • Hyperbilirubinemia   • Hyperammonemia ([facility] )   • Acute kidney injury ([facility] )   • Dysuria   • Type 2 MI (myocardial infarction) ([facility]  )       Past Medical History  Past Medical History:   Diagnosis Date   • Alcohol abuse    • Arthritis    • Cirrhosis ([facility] )    • COPD (chronic obstructive pulmonary disease) ([facility]  )     states has it   • DVT of proximal lower limb (HCC)     left calf   • Hemochromatosis    • Psoriasis    • Varices, esophageal (HCC)        Past Surgical History  Past Surgical History:   Procedure Laterality Date   • CATARACT EXTRACTION     • COLONOSCOPY     • CYST REMOVAL     • JOINT REPLACEMENT Bilateral    • RHINOPLASTY     • TOTAL HIP ARTHROPLASTY            06/02/20 1420   Note Type   Note type Eval only   Pain Assessment   Pain Assessment Tool Pain Assessment not indicated - pt denies pain   Pain Score 2  (mild back pain with mobility)   Home Living   Type of [address] One level;Performs ADLs on one level; Able to live on main level with bedroom/bathroom;Stairs to enter with rails  (4 [address])   Bathroom Shower/Tub Tub/shower unit   [employer]   (none at baseline)   [address]  (uses cane at baseline)   Prior Function   Level of [place] Independent with ADLs and functional mobility   Lives With Friend(s)   Receives Help From Family;Friend(s)   ADL Assistance Independent   IADLs Needs assistance   Falls in the last 6 months 0   Vocational Retired   Restrictions/Precautions   [place] Bearing Precautions Per Order No   Other Precautions Cognitive; Chair Alarm; Bed Alarm; Fall Risk;Pain   Cognition   Overall Cognitive Status WFL   Arousal/Participation Responsive  (flat affect)   Orientation Level Oriented X4   Following Commands Follows one step commands without difficulty   RLE Assessment   RLE Assessment X   Strength RLE   R Hip Flexion 4-/5   R Knee Extension 4/5   R Knee Flexion 4-/5   R Ankle Dorsiflexion 4-/5   LLE Assessment   LLE Assessment X   Strength LLE   L Hip Flexion 4-/5   L Knee Flexion 4-/5   L Knee Extension 4/5   L Ankle Dorsiflexion 4-/5   Bed Mobility   Rolling R 4  Minimal assistance   Additional items Assist x 1; Increased time required;Verbal cues   Supine to Sit 4  Minimal assistance   Additional items Assist x 2; Increased time required;Verbal cues   Sit to Supine 4  Minimal assistance   Additional items Assist x 2; Increased time required;Verbal cues   Transfers   Sit to Stand 4  Minimal assistance   Additional items Assist x 2; Increased time required;Verbal cues   Stand to Sit 4  Minimal assistance   Additional items Assist x 2; Increased time required;Verbal cues   Ambulation/Elevation   Gait pattern Decreased foot clearance; Short stride; Excessively slow; Inconsistent cordell; Wide DENZEL   Gait Assistance 4  Minimal assist   Additional items Assist x 2   Assistive Device Rolling walker   Distance 14'  (increased time to complete)   Stair Management Assistance Not tested   Balance   Static Sitting Fair   Dynamic Sitting Fair -   Static Standing Fair -   Dynamic Standing Poor +   Ambulatory Poor +   Endurance Deficit   Endurance Deficit Yes   Endurance Deficit Description fatigue, LBP   Activity Tolerance   Activity Tolerance Patient limited by fatigue;Patient limited by pain   Nurse Made Aware RN ok to see   Assessment   Prognosis Good   Problem List Decreased strength;Decreased range of motion;Decreased endurance; Impaired balance;Decreased mobility;Pain   Assessment Pt is 59 y o  male seen for PT evaluation s/p admit to [facility] on 5/30/2020 w/ Urinary tract infection  PT consulted to assess pt's functional mobility and d/c needs  Order placed for PT eval and tx, w/ activity order order  Comorbidities affecting pt's physical performance at time of assessment include: UTI, HTN, Sepsis, ANGELES, T2 DM and generalized weakness  PTA, pt was independent w/ all functional mobility w/ SPC  Personal factors affecting pt at time of IE include: ambulating w/ assistive device, stairs to enter home, inability to ambulate household distances, inability to perform ADLs and inability to live alone  Please find objective findings from PT assessment regarding body systems outlined above with impairments and limitations including weakness, decreased ROM, impaired balance, gait deviations, pain, decreased activity tolerance, decreased functional mobility tolerance and fall risk  The following objective measures performed on IE also reveal limitations: Barthel Index: 55/100  Pt's clinical presentation is currently unstable/unpredictable seen in pt's presentation  Pt to benefit from continued PT tx to address deficits as defined above and maximize level of functional independent mobility and consistency  From PT/mobility standpoint, recommendation at time of d/c would be STR pending progress in order to facilitate return to PLOF  Barriers to Discharge Inaccessible home environment;Decreased caregiver support  (Pt ? home alone, previously caregiver for friend brian)   Goals   Patient Goals to get stronger so I can walk; love to ride my motorcycle again   STG Expiration Date 06/16/20   Short Term Goal #1 1  Pt will complete bed mobility with Mod I to increase functional mobility  2  Pt will complete sit to stand transfers with Mod I to increase functional mobility  3  Pt will ambulate 150 ft with RW with Mod I without LOB 4  Pt will increase B/L LE strength by 1 grade to facilitate improved functional mobility with decreased risk of falls   5  Pt will increase standing balance to fair in order to decrease risk of falls  6  Pt  Will navigate 5 steps with SPC , supervised A x1  PT Treatment Day 0   Plan   Treatment/Interventions Functional transfer training;LE strengthening/ROM; Elevations; Therapeutic exercise; Endurance training;Bed mobility;Gait training;Patient/family training;Equipment eval/education   PT Frequency   (3-5x/week)   Recommendation   PT Discharge Recommendation Post-Acute Rehabilitation Services  (STR)   Equipment Recommended Walker   PT - OK to Discharge Yes  (to rehab only)   Barthel Index   Feeding 10   Bathing 0   Grooming Score 5   Dressing Score 5   Bladder Score 10   Bowels Score 10   Toilet Use Score 5   Transfers (Bed/Chair) Score 10   Mobility (Level Surface) Score 0   Stairs Score 0   Barthel Index Score 55         Bisi Padilla, PT

## 2020-06-02 NOTE — PROGRESS NOTES
Progress Note - Fred Rutledge 1955, 59 y o  male MRN: [id number]    Unit/Bed#: -01 Encounter: [id number]    Primary Care Provider: No primary care provider on file  Date and time admitted to hospital: 5/30/2020 11:51 AM        * Urinary tract infection  Assessment & Plan  · Urine culture positive showing growth of E coli  · Continue with ceftriaxone for now, can transition to oral antibiotic in next 24 hours  · follow-up urine culture final growth and sensitivity  · Of note patient is afebrile without leukocytosis  · Discussion with Urology this time patient Flomax this evening and voiding trial in a m  This well overall clear from Urology perspective discharge with outpatient follow-up  · Patient attempted on voiding trial today and had 51 mL PVR  · Patient overall can discharge from Urology perspective  · PT OT evaluation completed recommending short-term rehab case management to work on discharge    Sepsis [facility])  Assessment & Plan  · Likely secondary to underlying urinary tract infection  · Of note 1 of 2 blood cultures is positive question contamination 2nd blood culture remains negative at 48 hours  · Again patient remains afebrile without leukocytosis  · Patient is procalcitonin peaked tendon is currently at 2 37 would continue antibiotic  · No events on telemetry over 24 hours telemetry has since been discontinued  · Troponin elevated on admission however have remained flat suspect elevated troponin in setting of UTI/sepsis and further monitoring this time and patient is asymptomatic of chest pain    Acute kidney injury ([address] )  Assessment & Plan  · Likely secondary to volume loss from profuse diarrhea  · Creatinine now normalized ANGELES resolved status post IV hydration  · Continue to monitor     Dysuria  Assessment & Plan  · Plan as mentioned above    Hyperammonemia (HCC)  Assessment & Plan  · Noted elevated ammonia level of 66    · Patient has no altered mental status  · No further monitoring required at this time    Hypertension  Assessment & Plan  · Blood Pressure stable, monitor daily    Alcoholic cirrhosis (HCC)  Assessment & Plan  · Follows with GI as an outpatient, last seen nearly 1 year ago  · No evidence of encephalopathy on exam, no asterixis noted  · LFT's stable  · CT abdomen pelvis showing moderate mesenteric stranding worse from previous CT study of unclear etiology and present for 1 year no evidence of thrombus patient with no acute abdominal symptoms outpatient follow-up with GI ambulatory referral upon discharge    Hyperbilirubinemia  Assessment & Plan  · Secondary to underlying cirrhosis overall stable  · Monitor daily    Type 2 MI (myocardial infarction) (HCC)  Assessment & Plan  · Non chest pain associated elevation in troponin  · Likely secondary to underlying urinary tract infection and acute kidney injury in setting of cirrhosis  · No further inpatient intervention indicated at this time  · Troponins elevated but flat suspect non MI elevated troponin  · Telemetry has since been discontinued        VTE Pharmacologic Prophylaxis:   Pharmacologic: Pharmacologic VTE Prophylaxis contraindicated due to Thrombocytopenia  Mechanical VTE Prophylaxis in Place: Yes    Patient Centered Rounds: I have performed bedside rounds with nursing staff today  Discussions with Specialists or Other Care Team Provider:  Physical therapy/occupational therapy/case management    Education and Discussions with Family / Patient:  Patient    Time Spent for Care: 30 minutes  More than 50% of total time spent on counseling and coordination of care as described above      Current Length of Stay: 3 day(s)    Current Patient Status: Inpatient   Certification Statement: The patient will continue to require additional inpatient hospital stay due to Ongoing treatment in setting of UTI sepsis    Discharge Plan:  Pending placement anticipate discharge in next 24-48 hours    Code Status: Level 1 - Full Code      Subjective:   Patient reports his weakness continues however is improving slowly  Patient is nervous and we would send him home versus going to rehab patient as of this morning has not been evaluated by physical therapy or occupational therapy and we need to wait this evaluation was educated to the patient  Patient further verbalized that he has been getting some wheezing at times and would like respiratory treatments patient made aware that these were ordered yesterday that if he experience wheezing to let nursing know immediately stone evaluation can occur and respiratory could be called  Patient denies fevers or chills and generally states he starting to feel better wants to get stronger so that he can go home  Objective:     Vitals:   Temp (24hrs), Av 6 °F (37 °C), Min:98 5 °F (36 9 °C), Max:98 7 °F (37 1 °C)    Temp:  [98 5 °F (36 9 °C)-98 7 °F (37 1 °C)] 98 7 °F (37 1 °C)  HR:  [78-85] 85  Resp:  [18] 18  BP: (113-137)/(59-74) 132/74  SpO2:  [95 %-97 %] 97 %  Body mass index is 29 13 kg/m²  Input and Output Summary (last 24 hours): Intake/Output Summary (Last 24 hours) at 2020 1656  Last data filed at 2020 1129  Gross per 24 hour   Intake 1680 ml   Output 2750 ml   Net -1070 ml       Physical Exam:     Physical Exam   Constitutional: He is oriented to person, place, and time  HENT:   Head: Normocephalic and atraumatic  Eyes: Conjunctivae and EOM are normal    Neck: Normal range of motion  Cardiovascular: Normal rate, regular rhythm and normal heart sounds  Pulmonary/Chest: Effort normal and breath sounds normal  He has no wheezes  Abdominal: Soft  Bowel sounds are normal    Musculoskeletal: Normal range of motion  Neurological: He is alert and oriented to person, place, and time  Did not see patient ambulate  Did require some assistance to assist to sitting position   Skin: Skin is warm and dry  Psychiatric: He has a normal mood and affect   His behavior is normal          Additional Data:     Labs:    Results from last 7 days   Lab Units 06/02/20  0509 06/01/20  0611   WBC Thousand/uL 2 85* 3 28*   HEMOGLOBIN g/dL 11 7* 11 8*   HEMATOCRIT % 33 6* 33 6*   PLATELETS Thousands/uL 57* 61*   BANDS PCT % 2  --    NEUTROS PCT %  --  38*   LYMPHS PCT %  --  38   LYMPHO PCT % 45*  --    MONOS PCT %  --  17*   MONO PCT % 8  --    EOS PCT % 5 4     Results from last 7 days   Lab Units 06/02/20  0509   SODIUM mmol/L 137   POTASSIUM mmol/L 3 7   CHLORIDE mmol/L 106   CO2 mmol/L 23   BUN mg/dL 11   CREATININE mg/dL 0 61   ANION GAP mmol/L 8   CALCIUM mg/dL 7 7*   ALBUMIN g/dL 2 2*   TOTAL BILIRUBIN mg/dL 2 00*   ALK PHOS U/L 66   ALT U/L 60   AST U/L 139*   GLUCOSE RANDOM mg/dL 89     Results from last 7 days   Lab Units 05/30/20  1202   INR  1 57*             Results from last 7 days   Lab Units 06/02/20  0514 06/01/20  1237 05/30/20  2216 05/30/20  1719 05/30/20  1400 05/30/20  1202   LACTIC ACID mmol/L  --   --   --  2 0 3 5* 5 8*   PROCALCITONIN ng/ml 2 37* 3 57* 10 52*  --   --  6 59*           * I Have Reviewed All Lab Data Listed Above  * Additional Pertinent Lab Tests Reviewed: All Labs Within Last 24 Hours Reviewed        Recent Cultures (last 7 days):     Results from last 7 days   Lab Units 05/30/20  2212 05/30/20  1518 05/30/20  1216 05/30/20  1202   BLOOD CULTURE   --   --  No Growth at 48 hrs   Non lactose fermenting gram negative satish*   GRAM STAIN RESULT   --   --   --  Gram negative rods*   URINE CULTURE   --  20,000-29,000 cfu/ml Escherichia coli*  20,000-29,000 cfu/ml Escherichia coli*  --   --    LEGIONELLA URINARY ANTIGEN  Negative  --   --   --        Last 24 Hours Medication List:     Current Facility-Administered Medications:  acetaminophen 650 mg Oral Q6H PRN Patty Rubin MD    albuterol 2 5 mg Nebulization Q6H PRN BETTINA Lopez    azithromycin 500 mg Oral Q24H BETTINA Villa    cefTRIAXone 1,000 mg Intravenous Q24H Galileo Ortez MD Last Rate: 1,000 mg (06/02/20 1347)   magnesium oxide 400 mg Oral BID BETTINA Smith    nicotine 1 patch Transdermal Daily Dean Nazario MD    tamsulosin 0 4 mg Oral Daily With 5 BETTINA Baptiste         Today, Patient Was Seen By: BETTINA Smith    ** Please Note: Dictation voice to text software may have been used in the creation of this document   **

## 2020-06-02 NOTE — SOCIAL WORK
CM notified by PT/OT that pt can benefit from STR  SLIM also agrees that pt can benefit  CM will continue to follow

## 2020-06-02 NOTE — PLAN OF CARE
Problem: PHYSICAL THERAPY ADULT  Goal: Performs mobility at highest level of function for planned discharge setting  See evaluation for individualized goals  Description  Treatment/Interventions: Functional transfer training, LE strengthening/ROM, Elevations, Therapeutic exercise, Endurance training, Bed mobility, Gait training, Patient/family training, Equipment eval/education  Equipment Recommended: Orestes Almaraz       See flowsheet documentation for full assessment, interventions and recommendations  Outcome: Progressing  Note:   Prognosis: Good  Problem List: Decreased strength, Decreased range of motion, Decreased endurance, Impaired balance, Decreased mobility, Pain  Assessment: Pt is 59 y o  male seen for PT evaluation s/p admit to [facility] on 5/30/2020 w/ Urinary tract infection  PT consulted to assess pt's functional mobility and d/c needs  Order placed for PT eval and tx, w/ activity order order  Comorbidities affecting pt's physical performance at time of assessment include: UTI, HTN, Sepsis, ANGELES, T2 DM and generalized weakness  PTA, pt was independent w/ all functional mobility w/ SPC  Personal factors affecting pt at time of IE include: ambulating w/ assistive device, stairs to enter home, inability to ambulate household distances, inability to perform ADLs and inability to live alone  Please find objective findings from PT assessment regarding body systems outlined above with impairments and limitations including weakness, decreased ROM, impaired balance, gait deviations, pain, decreased activity tolerance, decreased functional mobility tolerance and fall risk  The following objective measures performed on IE also reveal limitations: Barthel Index: 55/100  Pt's clinical presentation is currently unstable/unpredictable seen in pt's presentation  Pt to benefit from continued PT tx to address deficits as defined above and maximize level of functional independent mobility and consistency   From PT/mobility standpoint, recommendation at time of d/c would be STR pending progress in order to facilitate return to PLOF  Barriers to Discharge: Inaccessible home environment, Decreased caregiver support(Pt ? 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

## 2020-06-02 NOTE — NURSING NOTE
Clarified d/c rivera order with Urology  As per Urology, discontinue rivera in am on 6/2/2020 (voiding trial and record PVR prior to d/c)

## 2020-06-02 NOTE — PROGRESS NOTES
Assumed patient care. Report received from Frankie. Patient in bed, awake, alert and oriented to self. Niece at bedside. Denies pain or discomforts at this time. Call light placed within reach. Bed in low position. Patient's rivera was removed at [phone]  Patient due to void by 1140 this morning

## 2020-06-02 NOTE — ASSESSMENT & PLAN NOTE
· Noted elevated ammonia level of 66    · Patient has no altered mental status  · No further monitoring required at this time

## 2020-06-03 VITALS
OXYGEN SATURATION: 96 % | RESPIRATION RATE: 19 BRPM | WEIGHT: 233.03 LBS | DIASTOLIC BLOOD PRESSURE: 84 MMHG | HEART RATE: 83 BPM | TEMPERATURE: 98.1 F | HEIGHT: 75 IN | BODY MASS INDEX: 28.97 KG/M2 | SYSTOLIC BLOOD PRESSURE: 143 MMHG

## 2020-06-03 LAB
ALBUMIN SERPL BCP-MCNC: 2 G/DL (ref 3.5–5)
ALP SERPL-CCNC: 71 U/L (ref 46–116)
ALT SERPL W P-5'-P-CCNC: 56 U/L (ref 12–78)
ANION GAP SERPL CALCULATED.3IONS-SCNC: 6 MMOL/L (ref 4–13)
AST SERPL W P-5'-P-CCNC: 114 U/L (ref 5–45)
BILIRUB SERPL-MCNC: 1.9 MG/DL (ref 0.2–1)
BUN SERPL-MCNC: 9 MG/DL (ref 5–25)
CALCIUM SERPL-MCNC: 7.6 MG/DL (ref 8.3–10.1)
CHLORIDE SERPL-SCNC: 105 MMOL/L (ref 100–108)
CO2 SERPL-SCNC: 25 MMOL/L (ref 21–32)
CREAT SERPL-MCNC: 0.62 MG/DL (ref 0.6–1.3)
ERYTHROCYTE [DISTWIDTH] IN BLOOD BY AUTOMATED COUNT: 14.9 % (ref 11.6–15.1)
GFR SERPL CREATININE-BSD FRML MDRD: 105 ML/MIN/1.73SQ M
GLUCOSE SERPL-MCNC: 110 MG/DL (ref 65–140)
HCT VFR BLD AUTO: 31.8 % (ref 36.5–49.3)
HGB BLD-MCNC: 11 G/DL (ref 12–17)
MAGNESIUM SERPL-MCNC: 1.6 MG/DL (ref 1.6–2.6)
MCH RBC QN AUTO: 37.3 PG (ref 26.8–34.3)
MCHC RBC AUTO-ENTMCNC: 34.6 G/DL (ref 31.4–37.4)
MCV RBC AUTO: 108 FL (ref 82–98)
NRBC BLD AUTO-RTO: 0 /100 WBCS
PLATELET # BLD AUTO: 60 THOUSANDS/UL (ref 149–390)
PMV BLD AUTO: 9.7 FL (ref 8.9–12.7)
POTASSIUM SERPL-SCNC: 3.7 MMOL/L (ref 3.5–5.3)
PROT SERPL-MCNC: 5.6 G/DL (ref 6.4–8.2)
RBC # BLD AUTO: 2.95 MILLION/UL (ref 3.88–5.62)
SODIUM SERPL-SCNC: 136 MMOL/L (ref 136–145)
WBC # BLD AUTO: 3.48 THOUSAND/UL (ref 4.31–10.16)

## 2020-06-03 PROCEDURE — 85027 COMPLETE CBC AUTOMATED: CPT | Performed by: STUDENT IN AN ORGANIZED HEALTH CARE EDUCATION/TRAINING PROGRAM

## 2020-06-03 PROCEDURE — 83735 ASSAY OF MAGNESIUM: CPT | Performed by: STUDENT IN AN ORGANIZED HEALTH CARE EDUCATION/TRAINING PROGRAM

## 2020-06-03 PROCEDURE — 99239 HOSP IP/OBS DSCHRG MGMT >30: CPT | Performed by: NURSE PRACTITIONER

## 2020-06-03 PROCEDURE — 80053 COMPREHEN METABOLIC PANEL: CPT | Performed by: STUDENT IN AN ORGANIZED HEALTH CARE EDUCATION/TRAINING PROGRAM

## 2020-06-03 RX ORDER — CEFDINIR 300 MG/1
300 CAPSULE ORAL EVERY 12 HOURS SCHEDULED
Refills: 0
Start: 2020-06-03 | End: 2020-06-10

## 2020-06-03 RX ORDER — TAMSULOSIN HYDROCHLORIDE 0.4 MG/1
0.4 CAPSULE ORAL
Refills: 0
Start: 2020-06-03

## 2020-06-03 RX ADMIN — NICOTINE 1 PATCH: 7 PATCH TRANSDERMAL at 09:44

## 2020-06-03 RX ADMIN — MAGNESIUM GLUCONATE 500 MG ORAL TABLET 400 MG: 500 TABLET ORAL at 09:44

## 2020-06-03 RX ADMIN — CEFTRIAXONE SODIUM 1000 MG: 10 INJECTION, POWDER, FOR SOLUTION INTRAVENOUS at 14:00

## 2020-06-03 NOTE — PLAN OF CARE
Problem: Potential for Falls  Goal: Patient will remain free of falls  Description  INTERVENTIONS:  - Assess patient frequently for physical needs  -  Identify cognitive and physical deficits and behaviors that affect risk of falls    -  [place] fall precautions as indicated by assessment   - Educate patient/family on patient safety including physical limitations  - Instruct patient to call for assistance with activity based on assessment  - Modify environment to reduce risk of injury  - Consider OT/PT consult to assist with strengthening/mobility  Outcome: Progressing     Problem: Prexisting or High Potential for Compromised Skin Integrity  Goal: Skin integrity is maintained or improved  Description  INTERVENTIONS:  - Identify patients at risk for skin breakdown  - Assess and monitor skin integrity  - Assess and monitor nutrition and hydration status  - Monitor labs   - Assess for incontinence   - Turn and reposition patient  - Assist with mobility/ambulation  - Relieve pressure over bony prominences  - Avoid friction and shearing  - Provide appropriate hygiene as needed including keeping skin clean and dry  - Evaluate need for skin moisturizer/barrier cream  - Collaborate with interdisciplinary team   - Patient/family teaching  - Consider wound care consult   Outcome: Progressing     Problem: PAIN - ADULT  Goal: Verbalizes/displays adequate comfort level or baseline comfort level  Description  Interventions:  - Encourage patient to monitor pain and request assistance  - Assess pain using appropriate pain scale  - Administer analgesics based on type and severity of pain and evaluate response  - Implement non-pharmacological measures as appropriate and evaluate response  - Consider cultural and social influences on pain and pain management  - Notify physician/advanced practitioner if interventions unsuccessful or patient reports new pain  Outcome: Progressing     Problem: DISCHARGE PLANNING  Goal: Discharge to home or other facility with appropriate resources  Description  INTERVENTIONS:  - Identify barriers to discharge w/patient and caregiver  - Arrange for needed discharge resources and transportation as appropriate  - Identify discharge learning needs (meds, wound care, etc )  - Arrange for interpretive services to assist at discharge as needed  - Refer to Case Management Department for coordinating discharge planning if the patient needs post-hospital services based on physician/advanced practitioner order or complex needs related to functional status, cognitive ability, or social support system  Outcome: Progressing     Problem: Knowledge Deficit  Goal: Patient/family/caregiver demonstrates understanding of disease process, treatment plan, medications, and discharge instructions  Description  Complete learning assessment and assess knowledge base    Interventions:  - Provide teaching at level of understanding  - Provide teaching via preferred learning methods  Outcome: Progressing     Problem: GASTROINTESTINAL - ADULT  Goal: Maintains or returns to baseline bowel function  Description  INTERVENTIONS:  - Assess bowel function  - Encourage oral fluids to ensure adequate hydration  - Administer IV fluids if ordered to ensure adequate hydration  - Administer ordered medications as needed  - Encourage mobilization and activity  - Consider nutritional services referral to assist patient with adequate nutrition and appropriate food choices  Outcome: Progressing  Goal: Maintains adequate nutritional intake  Description  INTERVENTIONS:  - Monitor percentage of each meal consumed  - Identify factors contributing to decreased intake, treat as appropriate  - Assist with meals as needed  - Monitor I&O, weight, and lab values if indicated  - Obtain nutrition services referral as needed  Outcome: Progressing     Problem: GENITOURINARY - ADULT  Goal: Maintains or returns to baseline urinary function  Description  INTERVENTIONS:  - Assess urinary function  - Encourage oral fluids to ensure adequate hydration if ordered  - Administer IV fluids as ordered to ensure adequate hydration  - Administer ordered medications as needed  - Offer frequent toileting  - Follow urinary retention protocol if ordered  Outcome: Progressing  Goal: Absence of urinary retention  Description  INTERVENTIONS:  - Assess patient’s ability to void and empty bladder  - Monitor I/O  - Bladder scan as needed  - Discuss with physician/AP medications to alleviate retention as needed  - Discuss catheterization for long term situations as appropriate  Outcome: Progressing  Goal: Urinary catheter remains patent  Description  INTERVENTIONS:  - Assess patency of urinary catheter  - If patient has a chronic rivera, consider changing catheter if non-functioning  - Follow guidelines for intermittent irrigation of non-functioning urinary catheter  Outcome: Progressing     Problem: METABOLIC, FLUID AND ELECTROLYTES - ADULT  Goal: Fluid balance maintained  Description  INTERVENTIONS:  - Monitor labs   - Monitor I/O and WT  - Instruct patient on fluid and nutrition as appropriate  - Assess for signs & symptoms of volume excess or deficit  Outcome: Progressing     Problem: SKIN/TISSUE INTEGRITY - ADULT  Goal: Skin integrity remains intact  Description  INTERVENTIONS  - Identify patients at risk for skin breakdown  - Assess and monitor skin integrity  - Assess and monitor nutrition and hydration status  - Monitor labs (i e  albumin)  - Assess for incontinence   - Turn and reposition patient  - Assist with mobility/ambulation  - Relieve pressure over bony prominences  - Avoid friction and shearing  - Provide appropriate hygiene as needed including keeping skin clean and dry  - Evaluate need for skin moisturizer/barrier cream  - Collaborate with interdisciplinary team (i e  Nutrition, Rehabilitation, etc )   - Patient/family teaching  Outcome: Progressing     Problem: DISCHARGE PLANNING - CARE MANAGEMENT  Goal: Discharge to post-acute care or home with appropriate resources  Description  INTERVENTIONS:  - Conduct assessment to determine patient/family and health care team treatment goals, and need for post-acute services based on payer coverage, community resources, and patient preferences, and barriers to discharge  - Address psychosocial, clinical, and financial barriers to discharge as identified in assessment in conjunction with the patient/family and health care team  - Arrange appropriate level of post-acute services according to patient’s   needs and preference and payer coverage in collaboration with the physician and health care team  - Communicate with and update the patient/family, physician, and health care team regarding progress on the discharge plan  - Arrange appropriate transportation to post-acute venues  Outcome: Progressing

## 2020-06-03 NOTE — PLAN OF CARE
Problem: Potential for Falls  Goal: Patient will remain free of falls  Description  INTERVENTIONS:  - Assess patient frequently for physical needs  -  Identify cognitive and physical deficits and behaviors that affect risk of falls    -  [place] fall precautions as indicated by assessment   - Educate patient/family on patient safety including physical limitations  - Instruct patient to call for assistance with activity based on assessment  - Modify environment to reduce risk of injury  - Consider OT/PT consult to assist with strengthening/mobility  6/3/2020 1400 by Chiquis Puente RN  Outcome: Completed  6/3/2020 0759 by Chiquis Puente RN  Outcome: Progressing     Problem: Prexisting or High Potential for Compromised Skin Integrity  Goal: Skin integrity is maintained or improved  Description  INTERVENTIONS:  - Identify patients at risk for skin breakdown  - Assess and monitor skin integrity  - Assess and monitor nutrition and hydration status  - Monitor labs   - Assess for incontinence   - Turn and reposition patient  - Assist with mobility/ambulation  - Relieve pressure over bony prominences  - Avoid friction and shearing  - Provide appropriate hygiene as needed including keeping skin clean and dry  - Evaluate need for skin moisturizer/barrier cream  - Collaborate with interdisciplinary team   - Patient/family teaching  - Consider wound care consult   6/3/2020 1400 by Chiquis Puente RN  Outcome: Completed  6/3/2020 0759 by Chiquis Puente RN  Outcome: Progressing     Problem: PAIN - ADULT  Goal: Verbalizes/displays adequate comfort level or baseline comfort level  Description  Interventions:  - Encourage patient to monitor pain and request assistance  - Assess pain using appropriate pain scale  - Administer analgesics based on type and severity of pain and evaluate response  - Implement non-pharmacological measures as appropriate and evaluate response  - Consider cultural and social influences on pain and pain management  - Notify physician/advanced practitioner if interventions unsuccessful or patient reports new pain  6/3/2020 1400 by Ariadne Herrera RN  Outcome: Completed  6/3/2020 0759 by Ariadne Herrera RN  Outcome: Progressing     Problem: DISCHARGE PLANNING  Goal: Discharge to home or other facility with appropriate resources  Description  INTERVENTIONS:  - Identify barriers to discharge w/patient and caregiver  - Arrange for needed discharge resources and transportation as appropriate  - Identify discharge learning needs (meds, wound care, etc )  - Arrange for interpretive services to assist at discharge as needed  - Refer to Case Management Department for coordinating discharge planning if the patient needs post-hospital services based on physician/advanced practitioner order or complex needs related to functional status, cognitive ability, or social support system  6/3/2020 1400 by Ariadne Herrera RN  Outcome: Completed  6/3/2020 0759 by Ariadne Herrera RN  Outcome: Progressing     Problem: Knowledge Deficit  Goal: Patient/family/caregiver demonstrates understanding of disease process, treatment plan, medications, and discharge instructions  Description  Complete learning assessment and assess knowledge base    Interventions:  - Provide teaching at level of understanding  - Provide teaching via preferred learning methods  6/3/2020 1400 by Ariadne Herrera RN  Outcome: Completed  6/3/2020 0759 by Ariadne Herrera RN  Outcome: Progressing     Problem: GASTROINTESTINAL - ADULT  Goal: Maintains or returns to baseline bowel function  Description  INTERVENTIONS:  - Assess bowel function  - Encourage oral fluids to ensure adequate hydration  - Administer IV fluids if ordered to ensure adequate hydration  - Administer ordered medications as needed  - Encourage mobilization and activity  - Consider nutritional services referral to assist patient with adequate nutrition and appropriate food choices  6/3/2020 1400 by Justyn Ulrich RN  Outcome: Completed  6/3/2020 0759 by Justyn Ulrich RN  Outcome: Progressing  Goal: Maintains adequate nutritional intake  Description  INTERVENTIONS:  - Monitor percentage of each meal consumed  - Identify factors contributing to decreased intake, treat as appropriate  - Assist with meals as needed  - Monitor I&O, weight, and lab values if indicated  - Obtain nutrition services referral as needed  6/3/2020 1400 by Justyn Ulrich RN  Outcome: Completed  6/3/2020 0759 by Justyn Ulrich RN  Outcome: Progressing     Problem: GENITOURINARY - ADULT  Goal: Maintains or returns to baseline urinary function  Description  INTERVENTIONS:  - Assess urinary function  - Encourage oral fluids to ensure adequate hydration if ordered  - Administer IV fluids as ordered to ensure adequate hydration  - Administer ordered medications as needed  - Offer frequent toileting  - Follow urinary retention protocol if ordered  6/3/2020 1400 by Justyn Ulrich RN  Outcome: Completed  6/3/2020 0759 by Justyn Ulrich RN  Outcome: Progressing  Goal: Absence of urinary retention  Description  INTERVENTIONS:  - Assess patient’s ability to void and empty bladder  - Monitor I/O  - Bladder scan as needed  - Discuss with physician/AP medications to alleviate retention as needed  - Discuss catheterization for long term situations as appropriate  6/3/2020 1400 by Justyn Ulrich RN  Outcome: Completed  6/3/2020 0759 by Justyn Ulrich RN  Outcome: Progressing  Goal: Urinary catheter remains patent  Description  INTERVENTIONS:  - Assess patency of urinary catheter  - If patient has a chronic rivera, consider changing catheter if non-functioning  - Follow guidelines for intermittent irrigation of non-functioning urinary catheter  6/3/2020 1400 by Justyn Ulrich RN  Outcome: Completed  6/3/2020 0759 by Justyn Ulrich RN  Outcome: Progressing Problem: METABOLIC, FLUID AND ELECTROLYTES - ADULT  Goal: Fluid balance maintained  Description  INTERVENTIONS:  - Monitor labs   - Monitor I/O and WT  - Instruct patient on fluid and nutrition as appropriate  - Assess for signs & symptoms of volume excess or deficit  6/3/2020 1400 by Ariadne Herrera RN  Outcome: Completed  6/3/2020 0759 by Ariadne Herrera RN  Outcome: Progressing     Problem: SKIN/TISSUE INTEGRITY - ADULT  Goal: Skin integrity remains intact  Description  INTERVENTIONS  - Identify patients at risk for skin breakdown  - Assess and monitor skin integrity  - Assess and monitor nutrition and hydration status  - Monitor labs (i e  albumin)  - Assess for incontinence   - Turn and reposition patient  - Assist with mobility/ambulation  - Relieve pressure over bony prominences  - Avoid friction and shearing  - Provide appropriate hygiene as needed including keeping skin clean and dry  - Evaluate need for skin moisturizer/barrier cream  - Collaborate with interdisciplinary team (i e  Nutrition, Rehabilitation, etc )   - Patient/family teaching  6/3/2020 1400 by Ariadne Herrera RN  Outcome: Completed  6/3/2020 0759 by Ariadne Herrera RN  Outcome: Progressing     Problem: DISCHARGE PLANNING - CARE MANAGEMENT  Goal: Discharge to post-acute care or home with appropriate resources  Description  INTERVENTIONS:  - Conduct assessment to determine patient/family and health care team treatment goals, and need for post-acute services based on payer coverage, community resources, and patient preferences, and barriers to discharge  - Address psychosocial, clinical, and financial barriers to discharge as identified in assessment in conjunction with the patient/family and health care team  - Arrange appropriate level of post-acute services according to patient’s   needs and preference and payer coverage in collaboration with the physician and health care team  - Communicate with and update the patient/family, physician, and health care team regarding progress on the discharge plan  - Arrange appropriate transportation to post-acute venues  6/3/2020 1400 by Nani Maxwell RN  Outcome: Completed  6/3/2020 0759 by Nani Maxwell RN  Outcome: Progressing

## 2020-06-03 NOTE — ASSESSMENT & PLAN NOTE
· Follows with GI as an outpatient, last seen nearly 1 year ago  · No evidence of encephalopathy on exam, no asterixis noted  · LFT's stable  · CT abdomen pelvis showing moderate mesenteric stranding worse from previous CT study of unclear etiology and present for 1 year no evidence of thrombus patient with no acute abdominal symptoms outpatient follow-up with GI ambulatory referral upon discharge

## 2020-06-03 NOTE — ASSESSMENT & PLAN NOTE
· Urine culture positive showing growth of E coli  · Continue with ceftriaxone for now, can transition to oral antibiotic in next 24 hours  · follow-up urine culture final growth and sensitivity  · Of note patient is afebrile without leukocytosis  · Discussion with Urology this time patient Flomax this evening and voiding trial in a m   This well overall clear from Urology perspective discharge with outpatient follow-up  · Patient had a successful voiding trial with no significant all PVR  · Patient overall can discharge from Urology perspective  · PT OT evaluation completed recommending short-term rehab case management to work on discharge

## 2020-06-03 NOTE — TELEPHONE ENCOUNTER
Michelle Walker is a 78-year-old male seen in consultation at [facility] for presumed urinary retention  Patient was discharged without [place] as he did well and his void trial   He will be discharged by Internal Medicine with Flomax  Please contact patient for non urgent follow-up in approximately 3--4 months  Thank you

## 2020-06-03 NOTE — INCIDENTAL FINDINGS
The following findings require follow up:  Radiographic finding   Finding:  Chest x-ray showing evidence lung nodule   Follow up required:  CT chest at patient   Follow up should be done within 6 week(s)    Please notify the following clinician to assist with the follow up:   Patient will need to obtain PCP in folic information given patient reports he smoked for 50 years understands the risk and will decide if he will go to get follow-up risk were again explained in patient stated understanding of not following up versus getting imaging    Info link provided on discharge

## 2020-06-03 NOTE — ASSESSMENT & PLAN NOTE
· Likely secondary to volume loss from profuse diarrhea  · Creatinine now normalized ANGELES resolved status post IV hydration  · Continue to monitor

## 2020-06-03 NOTE — DISCHARGE SUMMARY
Discharge- Trish Bobby 1955, 59 y o  male MRN: [id number]    Unit/Bed#: -01 Encounter: [id number]    Primary Care Provider: No primary care provider on file  Date and time admitted to hospital: 5/30/2020 11:51 AM        * Urinary tract infection  Assessment & Plan  · Urine culture positive showing growth of E coli  · Continue with ceftriaxone for now, can transition to oral antibiotic in next 24 hours  · follow-up urine culture final growth and sensitivity  · Of note patient is afebrile without leukocytosis  · Discussion with Urology this time patient Flomax this evening and voiding trial in a m  This well overall clear from Urology perspective discharge with outpatient follow-up  · Patient had a successful voiding trial with no significant all PVR  · Patient overall can discharge from Urology perspective  · PT OT evaluation completed recommending short-term rehab case management to work on discharge    Sepsis [facility])  Assessment & Plan  · Likely secondary to underlying urinary tract infection  · Of note 1 of 2 blood cultures is positive final growth disease E coli matching what was found in the urine  2nd blood culture remains negative at 72 hours patient remains in nontoxic current antibiotic treatment covering and would treat patient for a full 10 days    · Again patient remains afebrile without leukocytosis  · Patient is procalcitonin peaked at 10 2 and is is currently at 2 37 would continue antibiotic  · No events on telemetry over 24 hours telemetry has since been discontinued  · Troponin elevated on admission however have remained flat suspect elevated troponin in setting of UTI/sepsis and further monitoring this time and patient is asymptomatic of chest pain  · Patient was also lying around CK was elevated on admission do suspect a component of rhabdomyolysis which would explain patient's ongoing weakness also a component of depression due to patient has lost several family members patient denies thoughts of harming himself however is not interested in seeking counseling or medications at this time patient encouraged if he changes his mind he should follow-up with his PCP  Acute kidney injury ([facility] [address] )  Assessment & Plan  · Likely secondary to volume loss from profuse diarrhea  · Creatinine now normalized ANGELES resolved status post IV hydration  · Continue to monitor     Dysuria  Assessment & Plan  · Plan as mentioned above    Hyperammonemia (HCC)  Assessment & Plan  · Noted elevated ammonia level of 66  · Patient has no altered mental status  · No further monitoring required at this time    Hypertension  Assessment & Plan  · Blood Pressure stable, monitor daily    Alcoholic cirrhosis (HCC)  Assessment & Plan  · Follows with GI as an outpatient, last seen nearly 1 year ago  · No evidence of encephalopathy on exam, no asterixis noted  · LFT's stable  · CT abdomen pelvis showing moderate mesenteric stranding worse from previous CT study of unclear etiology and present for 1 year no evidence of thrombus patient with no acute abdominal symptoms outpatient follow-up with GI ambulatory referral upon discharge    Hyperbilirubinemia  Assessment & Plan  · Secondary to underlying cirrhosis overall stable  · Monitor daily    Type 2 MI (myocardial infarction) ([facility] [address] )  Assessment & Plan  · Non chest pain associated elevation in troponin  · Likely secondary to underlying urinary tract infection and acute kidney injury in setting of cirrhosis  · No further inpatient intervention indicated at this time  · Troponins elevated but flat suspect non MI elevated troponin  · Telemetry has since been discontinued        Discharging Physician / Practitioner: BETTINA Lopez  PCP: No primary care provider on file    Admission Date:   Admission Orders (From admission, onward)     Ordered        05/30/20 1351  Inpatient Admission  Once                   Discharge Date: 06/03/20    Resolved Problems  Date Reviewed: 6/1/2020 None          Consultations During Hospital Stay:  · Urology    Procedures Performed:   · Blood cultures x2 1 bottle positive 2nd 1-for growth at 72 hours? Contamination however culture grew out E coli which was grown out any urine patient remained nontoxic complete 10 day course of antibiotics  · CT abdomen pelvis 5/30:  Evidence of cystitis with marked bladder wall thickening with left ureteral wall enhancement suggesting ascending urinary tract infection  Moderate mesenteric stranding worsening compared to previous CT patient asymptomatic  Cirrhosis with portal hypertension  Gallbladder wall thickening waking and bases of hepatic cellular disease  · Chest x-ray 5/30:  ill-defined right middle lobe infiltrate could be atelectasis versus pneumonia  1 9 left cm upper lobe nodule is suggest as well a CT of the chest with contrast is recommended for further workup  · Portable chest x-ray follow-up 6/1:  No acute cardiopulmonary disease  Significant Findings / Test Results:   · Sepsis  · Urinary tract infection versus pneumonia  · Elevated CK suggesting rhabdomyolysis  · Elevated troponin suspected in setting of sepsis UTI    Incidental Findings:   · Acute kidney injury resolved   · 1 9 cm left upper lung nodule suggested follow-up CT findings were discussed with patient patient undecided if he will follow-up however was strongly recommended to have a CT in 6 weeks after he completes please treatment for pneumonia and patient states he will decide however and assumes the risk of not following up  Test Results Pending at Discharge (will require follow up):   ·       Outpatient Tests Requested:  · CT chest    Complications:  None    Reason for Admission:  Sepsis/UTI    Hospital Course:     Laverne Haile is a 59 y o  male patient who originally presented to the hospital on 5/30/2020 due to complaints of generalized weakness and dysuria started the morning    Patient reporting getting out of bed that morning feeling too weak to get out of bed  Patient reporting symptoms of sensation needing to void and nothing coming out  Patient denied shortness of breath fever nausea or chest pain  Patient reported 1 episode of diarrhea  Patient had a CT which was suggestive of UTI patient did meet sepsis criteria on admission blood cultures x2 were taken  Patient was also had an x-ray showing possible lung nodule and a right middle lobe infiltrate however had no respiratory symptoms  Patient was not initiated on Rocephin and azithromycin strep Legionella were negative so azithromycin with discontinued prior to discharge  Blood cultures x2 with 1 growing out E coli which was also in the urine 2nd blood culture remain negative for 72 hours  Patient had peak elevation of a procalcitonin 10 which trended down to 2  Patient further had an elevated CK in admits to lying around for several days  Patient overall show slowly showed clinical response was evaluated by PT OT who recommended short-term rehab and patient was agreeable  Patient was transition to oral antibiotics further recommended follow-up chest CT once antibiotics are completed to evaluate for possible lung nodule  Please see above list of diagnoses and related plan for additional information  Condition at Discharge: stable     Discharge Day Visit / Exam:     Subjective:  Patient reports ongoing weakness however has shown steady improvement denies fevers or chills denies any further urinary symptoms and is agreeable to going to rehab  Further discussion regarding patient appearing depressed patient denies thoughts of harming himself however after losing 3 members of the family it has been stressful however patient is declining any psychological counseling or antidepressant in feels he has been fine and understands this things  Patient is willing to go to rehab and has no other questions at this time    Vitals: Blood Pressure: 156/92 (06/03/20 "0722)  Pulse: 76 (06/03/20 0722)  Temperature: 98 3 °F (36 8 °C) (06/03/20 0722)  Temp Source: Oral (06/02/20 2319)  Respirations: 19 (06/02/20 2319)  Height: 6' 3\" (190 5 cm) (05/30/20 1441)  Weight - Scale: 106 kg (233 lb 0 4 oz) (05/30/20 1441)  SpO2: 96 % (06/03/20 0722)  Exam:   Physical Exam   Constitutional: He is oriented to person, place, and time  He appears well-developed and well-nourished  HENT:   Head: Normocephalic and atraumatic  Eyes: Conjunctivae and EOM are normal    Neck: Normal range of motion  Cardiovascular: Normal rate, regular rhythm and normal heart sounds  Pulmonary/Chest: Effort normal and breath sounds normal    Abdominal: Soft  Bowel sounds are normal    Musculoskeletal: Normal range of motion  Neurological: He is alert and oriented to person, place, and time  Skin: Skin is warm and dry  Psychiatric: He has a normal mood and affect  His behavior is normal        Discussion with Family:  Patient states he has been updating his family    Discharge instructions/Information to patient and family:   See after visit summary for information provided to patient and family  Provisions for Follow-Up Care:  See after visit summary for information related to follow-up care and any pertinent home health orders  Disposition:     Estefanía Edmondson (see below)    For Discharges to   [address] SNF:   · [facility] Texted SNF Physician    Planned Readmission:  None     Discharge Statement:  I spent 40 minutes discharging the patient  This time was spent on the day of discharge  I had direct contact with the patient on the day of discharge  Greater than 50% of the total time was spent examining patient, answering all patient questions, arranging and discussing plan of care with patient as well as directly providing post-discharge instructions  Additional time then spent on discharge activities      Discharge Medications:  See " after visit summary for reconciled discharge medications provided to patient and family        ** Please Note: This note has been constructed using a voice recognition system **

## 2020-06-03 NOTE — SOCIAL WORK
STR rec  CM met with pt to discuss and he is agreeable  CM placed referrals to SNFs  [facility] [place] accepted  CM met with pt to inform him of this  Pt agreeable  PASRR completed and faxed to [facility] [place]  [facility] [place] submitted for [facility]  Pt reported that his daughter is bringing clothes here to the hospital to take with him  Pt agreeable to wcv  Patience notified CM that auth obtained  CM contacted [facility] and scheduled wcv at 4 pm today  BENSON and RN aware  Katherin Wakefield at [facility] made aware of transport time

## 2020-06-03 NOTE — ASSESSMENT & PLAN NOTE
· Likely secondary to underlying urinary tract infection  · Of note 1 of 2 blood cultures is positive final growth disease E coli matching what was found in the urine  2nd blood culture remains negative at 72 hours patient remains in nontoxic current antibiotic treatment covering and would treat patient for a full 10 days  · Again patient remains afebrile without leukocytosis  · Patient is procalcitonin peaked at 10 2 and is is currently at 2 37 would continue antibiotic  · No events on telemetry over 24 hours telemetry has since been discontinued  · Troponin elevated on admission however have remained flat suspect elevated troponin in setting of UTI/sepsis and further monitoring this time and patient is asymptomatic of chest pain  · Patient was also lying around CK was elevated on admission do suspect a component of rhabdomyolysis which would explain patient's ongoing weakness also a component of depression due to patient has lost several family members patient denies thoughts of harming himself however is not interested in seeking counseling or medications at this time patient encouraged if he changes his mind he should follow-up with his PCP

## 2020-06-04 LAB
BACTERIA BLD CULT: ABNORMAL
BACTERIA BLD CULT: ABNORMAL
BACTERIA BLD CULT: NORMAL
GRAM STN SPEC: ABNORMAL

## 2020-06-04 NOTE — UTILIZATION REVIEW
Notification of Discharge  This is a Notification of Discharge from our facility [address]  Please be advised that this patient has been discharge from our facility  Below you will find the admission and discharge date and time including the patient’s disposition  PRESENTATION DATE: 5/30/2020 11:51 AM  OBS ADMISSION DATE:   IP ADMISSION DATE: 5/30/20 1351   DISCHARGE DATE: 6/3/2020  4:32 PM  DISPOSITION: Non [facility] SNF/[facility]U/SNU Non [facility] SNF/TCU/SNU   Admission Orders listed below:  Admission Orders (From admission, onward)     Ordered        05/30/20 1351  Inpatient Admission  Once                   Please contact the UR Department if additional information is required to close this patient's authorization/case  [address] Utilization Review Department  Main: [phone] x carefully listen to the prompts  All voicemails are confidential   [email or web address]  Send all requests for admission clinical reviews, approved or denied determinations and any other requests to dedicated fax number below belonging to the campus where the patient is receiving treatment   List of dedicated fax numbers:  [address] DENIALS (Administrative/Medical Necessity) [phone]   [address] [place] [address] (Maternity/NICU/Pediatrics) [phone]   [facility] [phone]   [address] [phone]   [address] [phone]   [address] [facility] [phone]   [facility] [place] [address] [phone]   [facility]  [phone]   [address] [facility] [phone]   [address] [place] [address] [address] [facility] [phone]

## 2020-06-11 ENCOUNTER — TRANSCRIBE ORDERS (OUTPATIENT)
Dept: ADMINISTRATIVE | Facility: HOSPITAL | Age: 65
End: 2020-06-11

## 2020-06-11 DIAGNOSIS — R91.8 LUNG MASS: Primary | ICD-10-CM

## 2020-10-10 ENCOUNTER — HOSPITAL ENCOUNTER (INPATIENT)
Facility: HOSPITAL | Age: 65
LOS: 6 days | Discharge: NURSING, REHAB OR LONG-TERM CARE | DRG: 441 | End: 2020-10-16
Attending: EMERGENCY MEDICINE | Admitting: STUDENT IN AN ORGANIZED HEALTH CARE EDUCATION/TRAINING PROGRAM
Payer: COMMERCIAL

## 2020-10-10 ENCOUNTER — APPOINTMENT (EMERGENCY)
Dept: CT IMAGING | Facility: HOSPITAL | Age: 65
DRG: 441 | End: 2020-10-10
Payer: COMMERCIAL

## 2020-10-10 DIAGNOSIS — K72.90 HEPATIC ENCEPHALOPATHY (HCC): ICD-10-CM

## 2020-10-10 DIAGNOSIS — E80.6 HYPERBILIRUBINEMIA: ICD-10-CM

## 2020-10-10 DIAGNOSIS — R78.1: ICD-10-CM

## 2020-10-10 DIAGNOSIS — N39.0 UTI (URINARY TRACT INFECTION): ICD-10-CM

## 2020-10-10 DIAGNOSIS — R41.82 ALTERED MENTAL STATUS: Primary | ICD-10-CM

## 2020-10-10 DIAGNOSIS — E72.20 HYPERAMMONEMIA (HCC): ICD-10-CM

## 2020-10-10 DIAGNOSIS — K70.30 ALCOHOLIC CIRRHOSIS OF LIVER WITHOUT ASCITES (HCC): ICD-10-CM

## 2020-10-10 DIAGNOSIS — R79.1 ABNORMAL INR: ICD-10-CM

## 2020-10-10 PROBLEM — K76.82 HEPATIC ENCEPHALOPATHY (HCC): Status: ACTIVE | Noted: 2020-10-10

## 2020-10-10 LAB
ALBUMIN SERPL BCP-MCNC: 2.6 G/DL (ref 3.5–5)
ALP SERPL-CCNC: 75 U/L (ref 46–116)
ALT SERPL W P-5'-P-CCNC: 50 U/L (ref 12–78)
AMMONIA PLAS-SCNC: 41 UMOL/L (ref 11–35)
AMPHETAMINES SERPL QL SCN: NEGATIVE
ANION GAP SERPL CALCULATED.3IONS-SCNC: 7 MMOL/L (ref 4–13)
APTT PPP: 39 SECONDS (ref 23–37)
AST SERPL W P-5'-P-CCNC: 92 U/L (ref 5–45)
ATRIAL RATE: 87 BPM
BACTERIA UR QL AUTO: ABNORMAL /HPF
BARBITURATES UR QL: NEGATIVE
BASOPHILS # BLD AUTO: 0.06 THOUSANDS/ΜL (ref 0–0.1)
BASOPHILS NFR BLD AUTO: 1 % (ref 0–1)
BENZODIAZ UR QL: NEGATIVE
BILIRUB DIRECT SERPL-MCNC: 2.47 MG/DL (ref 0–0.2)
BILIRUB SERPL-MCNC: 5.9 MG/DL (ref 0.2–1)
BILIRUB UR QL STRIP: ABNORMAL
BUN SERPL-MCNC: 15 MG/DL (ref 5–25)
CALCIUM SERPL-MCNC: 8.5 MG/DL (ref 8.3–10.1)
CHLORIDE SERPL-SCNC: 101 MMOL/L (ref 100–108)
CLARITY UR: ABNORMAL
CO2 SERPL-SCNC: 30 MMOL/L (ref 21–32)
COCAINE UR QL: NEGATIVE
COLOR UR: ABNORMAL
CREAT SERPL-MCNC: 0.75 MG/DL (ref 0.6–1.3)
EOSINOPHIL # BLD AUTO: 0.21 THOUSAND/ΜL (ref 0–0.61)
EOSINOPHIL NFR BLD AUTO: 4 % (ref 0–6)
ERYTHROCYTE [DISTWIDTH] IN BLOOD BY AUTOMATED COUNT: 15.7 % (ref 11.6–15.1)
ETHANOL SERPL-MCNC: <3 MG/DL (ref 0–3)
GFR SERPL CREATININE-BSD FRML MDRD: 96 ML/MIN/1.73SQ M
GLUCOSE SERPL-MCNC: 113 MG/DL (ref 65–140)
GLUCOSE SERPL-MCNC: 97 MG/DL (ref 65–140)
GLUCOSE UR STRIP-MCNC: NEGATIVE MG/DL
HCT VFR BLD AUTO: 36.8 % (ref 36.5–49.3)
HGB BLD-MCNC: 12.6 G/DL (ref 12–17)
HGB UR QL STRIP.AUTO: NEGATIVE
HYALINE CASTS #/AREA URNS LPF: ABNORMAL /LPF
IMM GRANULOCYTES # BLD AUTO: 0.01 THOUSAND/UL (ref 0–0.2)
IMM GRANULOCYTES NFR BLD AUTO: 0 % (ref 0–2)
INR PPP: 2.02 (ref 0.84–1.19)
KETONES UR STRIP-MCNC: ABNORMAL MG/DL
LEUKOCYTE ESTERASE UR QL STRIP: NEGATIVE
LYMPHOCYTES # BLD AUTO: 1.85 THOUSANDS/ΜL (ref 0.6–4.47)
LYMPHOCYTES NFR BLD AUTO: 37 % (ref 14–44)
MAGNESIUM SERPL-MCNC: 1.4 MG/DL (ref 1.6–2.6)
MCH RBC QN AUTO: 37.1 PG (ref 26.8–34.3)
MCHC RBC AUTO-ENTMCNC: 34.2 G/DL (ref 31.4–37.4)
MCV RBC AUTO: 108 FL (ref 82–98)
METHADONE UR QL: NEGATIVE
MONOCYTES # BLD AUTO: 0.57 THOUSAND/ΜL (ref 0.17–1.22)
MONOCYTES NFR BLD AUTO: 11 % (ref 4–12)
MUCOUS THREADS UR QL AUTO: ABNORMAL
NEUTROPHILS # BLD AUTO: 2.32 THOUSANDS/ΜL (ref 1.85–7.62)
NEUTS SEG NFR BLD AUTO: 47 % (ref 43–75)
NITRITE UR QL STRIP: POSITIVE
NON-SQ EPI CELLS URNS QL MICRO: ABNORMAL /HPF
NRBC BLD AUTO-RTO: 0 /100 WBCS
OPIATES UR QL SCN: POSITIVE
OXYCODONE+OXYMORPHONE UR QL SCN: POSITIVE
P AXIS: 74 DEGREES
PCP UR QL: NEGATIVE
PH UR STRIP.AUTO: 6 [PH]
PHOSPHATE SERPL-MCNC: 3.3 MG/DL (ref 2.3–4.1)
PLATELET # BLD AUTO: 108 THOUSANDS/UL (ref 149–390)
PMV BLD AUTO: 9.4 FL (ref 8.9–12.7)
POTASSIUM SERPL-SCNC: 3.9 MMOL/L (ref 3.5–5.3)
PR INTERVAL: 150 MS
PROT SERPL-MCNC: 6.1 G/DL (ref 6.4–8.2)
PROT UR STRIP-MCNC: ABNORMAL MG/DL
PROTHROMBIN TIME: 21.9 SECONDS (ref 11.6–14.5)
QRS AXIS: 66 DEGREES
QRSD INTERVAL: 94 MS
QT INTERVAL: 346 MS
QTC INTERVAL: 416 MS
RBC # BLD AUTO: 3.4 MILLION/UL (ref 3.88–5.62)
RBC #/AREA URNS AUTO: ABNORMAL /HPF
SODIUM SERPL-SCNC: 138 MMOL/L (ref 136–145)
SP GR UR STRIP.AUTO: >=1.03 (ref 1–1.03)
T WAVE AXIS: 89 DEGREES
THC UR QL: NEGATIVE
TROPONIN I SERPL-MCNC: 0.22 NG/ML
TSH SERPL DL<=0.05 MIU/L-ACNC: 2.88 UIU/ML (ref 0.36–3.74)
UROBILINOGEN UR QL STRIP.AUTO: >=8 E.U./DL
VENTRICULAR RATE: 87 BPM
WBC # BLD AUTO: 5.02 THOUSAND/UL (ref 4.31–10.16)
WBC #/AREA URNS AUTO: ABNORMAL /HPF

## 2020-10-10 PROCEDURE — 82140 ASSAY OF AMMONIA: CPT | Performed by: PHYSICIAN ASSISTANT

## 2020-10-10 PROCEDURE — 96374 THER/PROPH/DIAG INJ IV PUSH: CPT

## 2020-10-10 PROCEDURE — 99285 EMERGENCY DEPT VISIT HI MDM: CPT | Performed by: PHYSICIAN ASSISTANT

## 2020-10-10 PROCEDURE — 85610 PROTHROMBIN TIME: CPT | Performed by: INTERNAL MEDICINE

## 2020-10-10 PROCEDURE — 85730 THROMBOPLASTIN TIME PARTIAL: CPT | Performed by: PHYSICIAN ASSISTANT

## 2020-10-10 PROCEDURE — 84100 ASSAY OF PHOSPHORUS: CPT | Performed by: PHYSICIAN ASSISTANT

## 2020-10-10 PROCEDURE — 99285 EMERGENCY DEPT VISIT HI MDM: CPT

## 2020-10-10 PROCEDURE — 84443 ASSAY THYROID STIM HORMONE: CPT | Performed by: PHYSICIAN ASSISTANT

## 2020-10-10 PROCEDURE — 93010 ELECTROCARDIOGRAM REPORT: CPT | Performed by: INTERNAL MEDICINE

## 2020-10-10 PROCEDURE — G1004 CDSM NDSC: HCPCS

## 2020-10-10 PROCEDURE — 80076 HEPATIC FUNCTION PANEL: CPT | Performed by: PHYSICIAN ASSISTANT

## 2020-10-10 PROCEDURE — 70450 CT HEAD/BRAIN W/O DYE: CPT

## 2020-10-10 PROCEDURE — 80307 DRUG TEST PRSMV CHEM ANLYZR: CPT | Performed by: PHYSICIAN ASSISTANT

## 2020-10-10 PROCEDURE — 99223 1ST HOSP IP/OBS HIGH 75: CPT | Performed by: INTERNAL MEDICINE

## 2020-10-10 PROCEDURE — 84484 ASSAY OF TROPONIN QUANT: CPT | Performed by: PHYSICIAN ASSISTANT

## 2020-10-10 PROCEDURE — 85610 PROTHROMBIN TIME: CPT | Performed by: PHYSICIAN ASSISTANT

## 2020-10-10 PROCEDURE — 36415 COLL VENOUS BLD VENIPUNCTURE: CPT | Performed by: PHYSICIAN ASSISTANT

## 2020-10-10 PROCEDURE — 93005 ELECTROCARDIOGRAM TRACING: CPT

## 2020-10-10 PROCEDURE — 82948 REAGENT STRIP/BLOOD GLUCOSE: CPT

## 2020-10-10 PROCEDURE — 80048 BASIC METABOLIC PNL TOTAL CA: CPT | Performed by: PHYSICIAN ASSISTANT

## 2020-10-10 PROCEDURE — 80320 DRUG SCREEN QUANTALCOHOLS: CPT | Performed by: PHYSICIAN ASSISTANT

## 2020-10-10 PROCEDURE — 85025 COMPLETE CBC W/AUTO DIFF WBC: CPT | Performed by: PHYSICIAN ASSISTANT

## 2020-10-10 PROCEDURE — 83735 ASSAY OF MAGNESIUM: CPT | Performed by: PHYSICIAN ASSISTANT

## 2020-10-10 PROCEDURE — 81001 URINALYSIS AUTO W/SCOPE: CPT | Performed by: PHYSICIAN ASSISTANT

## 2020-10-10 RX ORDER — ACETAMINOPHEN 325 MG/1
650 TABLET ORAL EVERY 6 HOURS PRN
Status: DISCONTINUED | OUTPATIENT
Start: 2020-10-10 | End: 2020-10-16 | Stop reason: HOSPADM

## 2020-10-10 RX ORDER — LACTULOSE 20 G/30ML
30 SOLUTION ORAL ONCE
Status: COMPLETED | OUTPATIENT
Start: 2020-10-10 | End: 2020-10-10

## 2020-10-10 RX ORDER — NICOTINE 21 MG/24HR
1 PATCH, TRANSDERMAL 24 HOURS TRANSDERMAL DAILY
Status: DISCONTINUED | OUTPATIENT
Start: 2020-10-11 | End: 2020-10-16 | Stop reason: HOSPADM

## 2020-10-10 RX ORDER — TAMSULOSIN HYDROCHLORIDE 0.4 MG/1
0.4 CAPSULE ORAL
Status: DISCONTINUED | OUTPATIENT
Start: 2020-10-11 | End: 2020-10-16 | Stop reason: HOSPADM

## 2020-10-10 RX ORDER — LACTULOSE 20 G/30ML
10 SOLUTION ORAL 2 TIMES DAILY
Status: DISCONTINUED | OUTPATIENT
Start: 2020-10-11 | End: 2020-10-12

## 2020-10-10 RX ORDER — MAGNESIUM SULFATE HEPTAHYDRATE 40 MG/ML
2 INJECTION, SOLUTION INTRAVENOUS ONCE
Status: COMPLETED | OUTPATIENT
Start: 2020-10-10 | End: 2020-10-11

## 2020-10-10 RX ORDER — HEPARIN SODIUM 5000 [USP'U]/ML
5000 INJECTION, SOLUTION INTRAVENOUS; SUBCUTANEOUS EVERY 8 HOURS SCHEDULED
Status: DISCONTINUED | OUTPATIENT
Start: 2020-10-10 | End: 2020-10-16 | Stop reason: HOSPADM

## 2020-10-10 RX ADMIN — SODIUM CHLORIDE, PRESERVATIVE FREE 0.04 MG: 5 INJECTION INTRAVENOUS at 22:23

## 2020-10-10 RX ADMIN — LACTULOSE 30 G: 10 SOLUTION ORAL at 22:25

## 2020-10-10 RX ADMIN — MAGNESIUM SULFATE IN WATER 2 G: 40 INJECTION, SOLUTION INTRAVENOUS at 22:32

## 2020-10-10 RX ADMIN — SODIUM CHLORIDE 1000 ML: 0.9 INJECTION, SOLUTION INTRAVENOUS at 21:32

## 2020-10-10 RX ADMIN — HEPARIN SODIUM 5000 UNITS: 5000 INJECTION INTRAVENOUS; SUBCUTANEOUS at 23:38

## 2020-10-10 RX ADMIN — CEFTRIAXONE SODIUM 1000 MG: 10 INJECTION, POWDER, FOR SOLUTION INTRAVENOUS at 22:02

## 2020-10-11 ENCOUNTER — APPOINTMENT (INPATIENT)
Dept: ULTRASOUND IMAGING | Facility: HOSPITAL | Age: 65
DRG: 441 | End: 2020-10-11
Payer: COMMERCIAL

## 2020-10-11 LAB
ALBUMIN SERPL BCP-MCNC: 2.3 G/DL (ref 3.5–5)
ALP SERPL-CCNC: 69 U/L (ref 46–116)
ALT SERPL W P-5'-P-CCNC: 48 U/L (ref 12–78)
ANION GAP SERPL CALCULATED.3IONS-SCNC: 4 MMOL/L (ref 4–13)
AST SERPL W P-5'-P-CCNC: 90 U/L (ref 5–45)
BILIRUB SERPL-MCNC: 4.7 MG/DL (ref 0.2–1)
BUN SERPL-MCNC: 12 MG/DL (ref 5–25)
CALCIUM ALBUM COR SERPL-MCNC: 9.8 MG/DL (ref 8.3–10.1)
CALCIUM SERPL-MCNC: 8.4 MG/DL (ref 8.3–10.1)
CHLORIDE SERPL-SCNC: 103 MMOL/L (ref 100–108)
CO2 SERPL-SCNC: 31 MMOL/L (ref 21–32)
CREAT SERPL-MCNC: 0.79 MG/DL (ref 0.6–1.3)
ERYTHROCYTE [DISTWIDTH] IN BLOOD BY AUTOMATED COUNT: 15.7 % (ref 11.6–15.1)
GFR SERPL CREATININE-BSD FRML MDRD: 94 ML/MIN/1.73SQ M
GLUCOSE SERPL-MCNC: 120 MG/DL (ref 65–140)
HCT VFR BLD AUTO: 35 % (ref 36.5–49.3)
HGB BLD-MCNC: 11.8 G/DL (ref 12–17)
INR PPP: 2.13 (ref 0.84–1.19)
MCH RBC QN AUTO: 36.9 PG (ref 26.8–34.3)
MCHC RBC AUTO-ENTMCNC: 33.7 G/DL (ref 31.4–37.4)
MCV RBC AUTO: 109 FL (ref 82–98)
PLATELET # BLD AUTO: 95 THOUSANDS/UL (ref 149–390)
PMV BLD AUTO: 9.5 FL (ref 8.9–12.7)
POTASSIUM SERPL-SCNC: 4.2 MMOL/L (ref 3.5–5.3)
PROT SERPL-MCNC: 5.8 G/DL (ref 6.4–8.2)
PROTHROMBIN TIME: 22.8 SECONDS (ref 11.6–14.5)
RBC # BLD AUTO: 3.2 MILLION/UL (ref 3.88–5.62)
SODIUM SERPL-SCNC: 138 MMOL/L (ref 136–145)
TROPONIN I SERPL-MCNC: 0.18 NG/ML
TROPONIN I SERPL-MCNC: 0.2 NG/ML
WBC # BLD AUTO: 5.27 THOUSAND/UL (ref 4.31–10.16)

## 2020-10-11 PROCEDURE — 85027 COMPLETE CBC AUTOMATED: CPT | Performed by: STUDENT IN AN ORGANIZED HEALTH CARE EDUCATION/TRAINING PROGRAM

## 2020-10-11 PROCEDURE — 97167 OT EVAL HIGH COMPLEX 60 MIN: CPT

## 2020-10-11 PROCEDURE — 84484 ASSAY OF TROPONIN QUANT: CPT | Performed by: INTERNAL MEDICINE

## 2020-10-11 PROCEDURE — 93005 ELECTROCARDIOGRAM TRACING: CPT

## 2020-10-11 PROCEDURE — 97163 PT EVAL HIGH COMPLEX 45 MIN: CPT

## 2020-10-11 PROCEDURE — 76705 ECHO EXAM OF ABDOMEN: CPT

## 2020-10-11 PROCEDURE — 80053 COMPREHEN METABOLIC PANEL: CPT | Performed by: STUDENT IN AN ORGANIZED HEALTH CARE EDUCATION/TRAINING PROGRAM

## 2020-10-11 PROCEDURE — 99232 SBSQ HOSP IP/OBS MODERATE 35: CPT | Performed by: STUDENT IN AN ORGANIZED HEALTH CARE EDUCATION/TRAINING PROGRAM

## 2020-10-11 PROCEDURE — 94762 N-INVAS EAR/PLS OXIMTRY CONT: CPT

## 2020-10-11 RX ADMIN — HEPARIN SODIUM 5000 UNITS: 5000 INJECTION INTRAVENOUS; SUBCUTANEOUS at 05:38

## 2020-10-11 RX ADMIN — HEPARIN SODIUM 5000 UNITS: 5000 INJECTION INTRAVENOUS; SUBCUTANEOUS at 21:37

## 2020-10-11 RX ADMIN — LACTULOSE 10 G: 10 SOLUTION ORAL at 17:07

## 2020-10-11 RX ADMIN — TAMSULOSIN HYDROCHLORIDE 0.4 MG: 0.4 CAPSULE ORAL at 15:38

## 2020-10-11 RX ADMIN — LACTULOSE 10 G: 10 SOLUTION ORAL at 08:35

## 2020-10-11 RX ADMIN — HEPARIN SODIUM 5000 UNITS: 5000 INJECTION INTRAVENOUS; SUBCUTANEOUS at 15:38

## 2020-10-11 RX ADMIN — NICOTINE 1 PATCH: 14 PATCH TRANSDERMAL at 08:35

## 2020-10-12 ENCOUNTER — APPOINTMENT (INPATIENT)
Dept: INTERVENTIONAL RADIOLOGY/VASCULAR | Facility: HOSPITAL | Age: 65
DRG: 441 | End: 2020-10-12
Payer: COMMERCIAL

## 2020-10-12 LAB
ATRIAL RATE: 79 BPM
ATRIAL RATE: 79 BPM
ATRIAL RATE: 82 BPM
FOLATE SERPL-MCNC: 3.4 NG/ML (ref 3.1–17.5)
MAGNESIUM SERPL-MCNC: 1.6 MG/DL (ref 1.6–2.6)
P AXIS: 35 DEGREES
P AXIS: 41 DEGREES
P AXIS: 45 DEGREES
PR INTERVAL: 148 MS
PR INTERVAL: 148 MS
PR INTERVAL: 152 MS
QRS AXIS: -1 DEGREES
QRS AXIS: -2 DEGREES
QRS AXIS: 3 DEGREES
QRSD INTERVAL: 92 MS
QRSD INTERVAL: 94 MS
QRSD INTERVAL: 98 MS
QT INTERVAL: 364 MS
QT INTERVAL: 366 MS
QT INTERVAL: 374 MS
QTC INTERVAL: 417 MS
QTC INTERVAL: 419 MS
QTC INTERVAL: 436 MS
T WAVE AXIS: 141 DEGREES
T WAVE AXIS: 146 DEGREES
T WAVE AXIS: 154 DEGREES
VENTRICULAR RATE: 79 BPM
VENTRICULAR RATE: 79 BPM
VENTRICULAR RATE: 82 BPM
VIT B12 SERPL-MCNC: 1549 PG/ML (ref 100–900)

## 2020-10-12 PROCEDURE — 99223 1ST HOSP IP/OBS HIGH 75: CPT | Performed by: INTERNAL MEDICINE

## 2020-10-12 PROCEDURE — 94762 N-INVAS EAR/PLS OXIMTRY CONT: CPT

## 2020-10-12 PROCEDURE — 82746 ASSAY OF FOLIC ACID SERUM: CPT | Performed by: STUDENT IN AN ORGANIZED HEALTH CARE EDUCATION/TRAINING PROGRAM

## 2020-10-12 PROCEDURE — 82607 VITAMIN B-12: CPT | Performed by: STUDENT IN AN ORGANIZED HEALTH CARE EDUCATION/TRAINING PROGRAM

## 2020-10-12 PROCEDURE — 99232 SBSQ HOSP IP/OBS MODERATE 35: CPT | Performed by: STUDENT IN AN ORGANIZED HEALTH CARE EDUCATION/TRAINING PROGRAM

## 2020-10-12 PROCEDURE — 49083 ABD PARACENTESIS W/IMAGING: CPT

## 2020-10-12 PROCEDURE — 83735 ASSAY OF MAGNESIUM: CPT | Performed by: STUDENT IN AN ORGANIZED HEALTH CARE EDUCATION/TRAINING PROGRAM

## 2020-10-12 PROCEDURE — 76705 ECHO EXAM OF ABDOMEN: CPT | Performed by: RADIOLOGY

## 2020-10-12 PROCEDURE — 93010 ELECTROCARDIOGRAM REPORT: CPT | Performed by: INTERNAL MEDICINE

## 2020-10-12 RX ORDER — FUROSEMIDE 20 MG/1
20 TABLET ORAL ONCE
Status: DISCONTINUED | OUTPATIENT
Start: 2020-10-12 | End: 2020-10-16

## 2020-10-12 RX ORDER — LACTULOSE 20 G/30ML
30 SOLUTION ORAL 3 TIMES DAILY
Status: DISCONTINUED | OUTPATIENT
Start: 2020-10-12 | End: 2020-10-16 | Stop reason: HOSPADM

## 2020-10-12 RX ORDER — SPIRONOLACTONE 25 MG/1
50 TABLET ORAL DAILY
Status: DISCONTINUED | OUTPATIENT
Start: 2020-10-12 | End: 2020-10-16 | Stop reason: HOSPADM

## 2020-10-12 RX ORDER — PREDNISOLONE SODIUM PHOSPHATE 15 MG/5ML
40 SOLUTION ORAL DAILY
Status: DISCONTINUED | OUTPATIENT
Start: 2020-10-12 | End: 2020-10-16 | Stop reason: HOSPADM

## 2020-10-12 RX ADMIN — HEPARIN SODIUM 5000 UNITS: 5000 INJECTION INTRAVENOUS; SUBCUTANEOUS at 15:26

## 2020-10-12 RX ADMIN — HEPARIN SODIUM 5000 UNITS: 5000 INJECTION INTRAVENOUS; SUBCUTANEOUS at 05:34

## 2020-10-12 RX ADMIN — NICOTINE 1 PATCH: 14 PATCH TRANSDERMAL at 10:23

## 2020-10-12 RX ADMIN — HEPARIN SODIUM 5000 UNITS: 5000 INJECTION INTRAVENOUS; SUBCUTANEOUS at 21:00

## 2020-10-12 RX ADMIN — LACTULOSE 30 G: 10 SOLUTION ORAL at 15:20

## 2020-10-12 RX ADMIN — LACTULOSE 30 G: 10 SOLUTION ORAL at 21:00

## 2020-10-12 RX ADMIN — TAMSULOSIN HYDROCHLORIDE 0.4 MG: 0.4 CAPSULE ORAL at 17:17

## 2020-10-12 RX ADMIN — PREDNISOLONE SODIUM PHOSPHATE 40 MG: 15 SOLUTION ORAL at 10:44

## 2020-10-13 ENCOUNTER — APPOINTMENT (INPATIENT)
Dept: MRI IMAGING | Facility: HOSPITAL | Age: 65
DRG: 441 | End: 2020-10-13
Payer: COMMERCIAL

## 2020-10-13 LAB
AFP-TM SERPL-MCNC: 2.9 NG/ML (ref 0.5–8)
ALBUMIN SERPL BCP-MCNC: 2.4 G/DL (ref 3.5–5)
ALP SERPL-CCNC: 76 U/L (ref 46–116)
ALT SERPL W P-5'-P-CCNC: 56 U/L (ref 12–78)
ANION GAP SERPL CALCULATED.3IONS-SCNC: 6 MMOL/L (ref 4–13)
AST SERPL W P-5'-P-CCNC: 87 U/L (ref 5–45)
BASOPHILS # BLD AUTO: 0.01 THOUSANDS/ΜL (ref 0–0.1)
BASOPHILS NFR BLD AUTO: 0 % (ref 0–1)
BILIRUB SERPL-MCNC: 3.7 MG/DL (ref 0.2–1)
BUN SERPL-MCNC: 11 MG/DL (ref 5–25)
CALCIUM ALBUM COR SERPL-MCNC: 9.9 MG/DL (ref 8.3–10.1)
CALCIUM SERPL-MCNC: 8.6 MG/DL (ref 8.3–10.1)
CHLORIDE SERPL-SCNC: 100 MMOL/L (ref 100–108)
CO2 SERPL-SCNC: 28 MMOL/L (ref 21–32)
CREAT SERPL-MCNC: 0.71 MG/DL (ref 0.6–1.3)
EOSINOPHIL # BLD AUTO: 0 THOUSAND/ΜL (ref 0–0.61)
EOSINOPHIL NFR BLD AUTO: 0 % (ref 0–6)
ERYTHROCYTE [DISTWIDTH] IN BLOOD BY AUTOMATED COUNT: 15.6 % (ref 11.6–15.1)
GFR SERPL CREATININE-BSD FRML MDRD: 98 ML/MIN/1.73SQ M
GLUCOSE SERPL-MCNC: 245 MG/DL (ref 65–140)
HCT VFR BLD AUTO: 36.9 % (ref 36.5–49.3)
HGB BLD-MCNC: 12.6 G/DL (ref 12–17)
IMM GRANULOCYTES # BLD AUTO: 0.01 THOUSAND/UL (ref 0–0.2)
IMM GRANULOCYTES NFR BLD AUTO: 0 % (ref 0–2)
INR PPP: 1.96 (ref 0.84–1.19)
LYMPHOCYTES # BLD AUTO: 0.93 THOUSANDS/ΜL (ref 0.6–4.47)
LYMPHOCYTES NFR BLD AUTO: 26 % (ref 14–44)
MCH RBC QN AUTO: 36.6 PG (ref 26.8–34.3)
MCHC RBC AUTO-ENTMCNC: 34.1 G/DL (ref 31.4–37.4)
MCV RBC AUTO: 107 FL (ref 82–98)
MONOCYTES # BLD AUTO: 0.36 THOUSAND/ΜL (ref 0.17–1.22)
MONOCYTES NFR BLD AUTO: 10 % (ref 4–12)
NEUTROPHILS # BLD AUTO: 2.33 THOUSANDS/ΜL (ref 1.85–7.62)
NEUTS SEG NFR BLD AUTO: 64 % (ref 43–75)
NRBC BLD AUTO-RTO: 0 /100 WBCS
PLATELET # BLD AUTO: 79 THOUSANDS/UL (ref 149–390)
PMV BLD AUTO: 9.6 FL (ref 8.9–12.7)
POTASSIUM SERPL-SCNC: 4.5 MMOL/L (ref 3.5–5.3)
PROT SERPL-MCNC: 6.3 G/DL (ref 6.4–8.2)
PROTHROMBIN TIME: 22.6 SECONDS (ref 11.6–14.5)
RBC # BLD AUTO: 3.44 MILLION/UL (ref 3.88–5.62)
SODIUM SERPL-SCNC: 134 MMOL/L (ref 136–145)
WBC # BLD AUTO: 3.64 THOUSAND/UL (ref 4.31–10.16)

## 2020-10-13 PROCEDURE — 80053 COMPREHEN METABOLIC PANEL: CPT | Performed by: STUDENT IN AN ORGANIZED HEALTH CARE EDUCATION/TRAINING PROGRAM

## 2020-10-13 PROCEDURE — 82105 ALPHA-FETOPROTEIN SERUM: CPT | Performed by: PHYSICIAN ASSISTANT

## 2020-10-13 PROCEDURE — 85610 PROTHROMBIN TIME: CPT | Performed by: STUDENT IN AN ORGANIZED HEALTH CARE EDUCATION/TRAINING PROGRAM

## 2020-10-13 PROCEDURE — 99232 SBSQ HOSP IP/OBS MODERATE 35: CPT | Performed by: INTERNAL MEDICINE

## 2020-10-13 PROCEDURE — 85025 COMPLETE CBC W/AUTO DIFF WBC: CPT | Performed by: STUDENT IN AN ORGANIZED HEALTH CARE EDUCATION/TRAINING PROGRAM

## 2020-10-13 PROCEDURE — G1004 CDSM NDSC: HCPCS

## 2020-10-13 PROCEDURE — 74181 MRI ABDOMEN W/O CONTRAST: CPT

## 2020-10-13 PROCEDURE — 74183 MRI ABD W/O CNTR FLWD CNTR: CPT

## 2020-10-13 RX ADMIN — NICOTINE 1 PATCH: 14 PATCH TRANSDERMAL at 09:49

## 2020-10-13 RX ADMIN — PREDNISOLONE SODIUM PHOSPHATE 40 MG: 15 SOLUTION ORAL at 10:56

## 2020-10-13 RX ADMIN — RIFAXIMIN 550 MG: 550 TABLET ORAL at 10:56

## 2020-10-13 RX ADMIN — HEPARIN SODIUM 5000 UNITS: 5000 INJECTION INTRAVENOUS; SUBCUTANEOUS at 15:31

## 2020-10-13 RX ADMIN — LACTULOSE 30 G: 10 SOLUTION ORAL at 21:24

## 2020-10-13 RX ADMIN — LACTULOSE 30 G: 10 SOLUTION ORAL at 15:31

## 2020-10-13 RX ADMIN — HEPARIN SODIUM 5000 UNITS: 5000 INJECTION INTRAVENOUS; SUBCUTANEOUS at 05:21

## 2020-10-13 RX ADMIN — RIFAXIMIN 550 MG: 550 TABLET ORAL at 21:26

## 2020-10-13 RX ADMIN — TAMSULOSIN HYDROCHLORIDE 0.4 MG: 0.4 CAPSULE ORAL at 15:31

## 2020-10-13 RX ADMIN — HEPARIN SODIUM 5000 UNITS: 5000 INJECTION INTRAVENOUS; SUBCUTANEOUS at 21:24

## 2020-10-13 RX ADMIN — LACTULOSE 30 G: 10 SOLUTION ORAL at 09:49

## 2020-10-14 ENCOUNTER — APPOINTMENT (INPATIENT)
Dept: GASTROENTEROLOGY | Facility: HOSPITAL | Age: 65
DRG: 441 | End: 2020-10-14
Payer: COMMERCIAL

## 2020-10-14 ENCOUNTER — ANESTHESIA (INPATIENT)
Dept: GASTROENTEROLOGY | Facility: HOSPITAL | Age: 65
DRG: 441 | End: 2020-10-14
Payer: COMMERCIAL

## 2020-10-14 ENCOUNTER — ANESTHESIA EVENT (INPATIENT)
Dept: GASTROENTEROLOGY | Facility: HOSPITAL | Age: 65
DRG: 441 | End: 2020-10-14
Payer: COMMERCIAL

## 2020-10-14 VITALS — HEART RATE: 84 BPM

## 2020-10-14 LAB
ALBUMIN SERPL BCP-MCNC: 2.5 G/DL (ref 3.5–5)
ALP SERPL-CCNC: 74 U/L (ref 46–116)
ALT SERPL W P-5'-P-CCNC: 56 U/L (ref 12–78)
ANION GAP SERPL CALCULATED.3IONS-SCNC: 4 MMOL/L (ref 4–13)
AST SERPL W P-5'-P-CCNC: 80 U/L (ref 5–45)
BASOPHILS # BLD AUTO: 0.02 THOUSANDS/ΜL (ref 0–0.1)
BASOPHILS NFR BLD AUTO: 0 % (ref 0–1)
BILIRUB SERPL-MCNC: 3.3 MG/DL (ref 0.2–1)
BUN SERPL-MCNC: 9 MG/DL (ref 5–25)
CALCIUM ALBUM COR SERPL-MCNC: 10.2 MG/DL (ref 8.3–10.1)
CALCIUM SERPL-MCNC: 9 MG/DL (ref 8.3–10.1)
CHLORIDE SERPL-SCNC: 102 MMOL/L (ref 100–108)
CO2 SERPL-SCNC: 31 MMOL/L (ref 21–32)
CREAT SERPL-MCNC: 0.85 MG/DL (ref 0.6–1.3)
EOSINOPHIL # BLD AUTO: 0.04 THOUSAND/ΜL (ref 0–0.61)
EOSINOPHIL NFR BLD AUTO: 1 % (ref 0–6)
ERYTHROCYTE [DISTWIDTH] IN BLOOD BY AUTOMATED COUNT: 16 % (ref 11.6–15.1)
GFR SERPL CREATININE-BSD FRML MDRD: 91 ML/MIN/1.73SQ M
GLUCOSE SERPL-MCNC: 226 MG/DL (ref 65–140)
HCT VFR BLD AUTO: 37.1 % (ref 36.5–49.3)
HGB BLD-MCNC: 12.7 G/DL (ref 12–17)
IMM GRANULOCYTES # BLD AUTO: 0.02 THOUSAND/UL (ref 0–0.2)
IMM GRANULOCYTES NFR BLD AUTO: 0 % (ref 0–2)
INR PPP: 1.92 (ref 0.84–1.19)
LYMPHOCYTES # BLD AUTO: 1.42 THOUSANDS/ΜL (ref 0.6–4.47)
LYMPHOCYTES NFR BLD AUTO: 28 % (ref 14–44)
MCH RBC QN AUTO: 37 PG (ref 26.8–34.3)
MCHC RBC AUTO-ENTMCNC: 34.2 G/DL (ref 31.4–37.4)
MCV RBC AUTO: 108 FL (ref 82–98)
MONOCYTES # BLD AUTO: 0.57 THOUSAND/ΜL (ref 0.17–1.22)
MONOCYTES NFR BLD AUTO: 11 % (ref 4–12)
NEUTROPHILS # BLD AUTO: 3.05 THOUSANDS/ΜL (ref 1.85–7.62)
NEUTS SEG NFR BLD AUTO: 60 % (ref 43–75)
NRBC BLD AUTO-RTO: 0 /100 WBCS
PLATELET # BLD AUTO: 103 THOUSANDS/UL (ref 149–390)
PMV BLD AUTO: 10.1 FL (ref 8.9–12.7)
POTASSIUM SERPL-SCNC: 4.3 MMOL/L (ref 3.5–5.3)
PROT SERPL-MCNC: 6.2 G/DL (ref 6.4–8.2)
PROTHROMBIN TIME: 22.2 SECONDS (ref 11.6–14.5)
RBC # BLD AUTO: 3.43 MILLION/UL (ref 3.88–5.62)
SODIUM SERPL-SCNC: 137 MMOL/L (ref 136–145)
WBC # BLD AUTO: 5.12 THOUSAND/UL (ref 4.31–10.16)

## 2020-10-14 PROCEDURE — 85610 PROTHROMBIN TIME: CPT | Performed by: INTERNAL MEDICINE

## 2020-10-14 PROCEDURE — 99232 SBSQ HOSP IP/OBS MODERATE 35: CPT | Performed by: INTERNAL MEDICINE

## 2020-10-14 PROCEDURE — 0DB78ZX EXCISION OF STOMACH, PYLORUS, VIA NATURAL OR ARTIFICIAL OPENING ENDOSCOPIC, DIAGNOSTIC: ICD-10-PCS | Performed by: INTERNAL MEDICINE

## 2020-10-14 PROCEDURE — 80053 COMPREHEN METABOLIC PANEL: CPT | Performed by: INTERNAL MEDICINE

## 2020-10-14 PROCEDURE — 88305 TISSUE EXAM BY PATHOLOGIST: CPT | Performed by: PATHOLOGY

## 2020-10-14 PROCEDURE — 43239 EGD BIOPSY SINGLE/MULTIPLE: CPT | Performed by: INTERNAL MEDICINE

## 2020-10-14 PROCEDURE — 85025 COMPLETE CBC W/AUTO DIFF WBC: CPT | Performed by: INTERNAL MEDICINE

## 2020-10-14 PROCEDURE — 94762 N-INVAS EAR/PLS OXIMTRY CONT: CPT

## 2020-10-14 PROCEDURE — 97110 THERAPEUTIC EXERCISES: CPT

## 2020-10-14 PROCEDURE — 97116 GAIT TRAINING THERAPY: CPT

## 2020-10-14 RX ORDER — PANTOPRAZOLE SODIUM 40 MG/1
40 TABLET, DELAYED RELEASE ORAL
Status: DISCONTINUED | OUTPATIENT
Start: 2020-10-15 | End: 2020-10-16 | Stop reason: HOSPADM

## 2020-10-14 RX ORDER — LIDOCAINE HYDROCHLORIDE 10 MG/ML
INJECTION, SOLUTION EPIDURAL; INFILTRATION; INTRACAUDAL; PERINEURAL AS NEEDED
Status: DISCONTINUED | OUTPATIENT
Start: 2020-10-14 | End: 2020-10-14

## 2020-10-14 RX ORDER — SODIUM CHLORIDE, SODIUM LACTATE, POTASSIUM CHLORIDE, CALCIUM CHLORIDE 600; 310; 30; 20 MG/100ML; MG/100ML; MG/100ML; MG/100ML
125 INJECTION, SOLUTION INTRAVENOUS CONTINUOUS
Status: DISCONTINUED | OUTPATIENT
Start: 2020-10-14 | End: 2020-10-16 | Stop reason: HOSPADM

## 2020-10-14 RX ORDER — PROPOFOL 10 MG/ML
INJECTION, EMULSION INTRAVENOUS AS NEEDED
Status: DISCONTINUED | OUTPATIENT
Start: 2020-10-14 | End: 2020-10-14

## 2020-10-14 RX ADMIN — TAMSULOSIN HYDROCHLORIDE 0.4 MG: 0.4 CAPSULE ORAL at 17:21

## 2020-10-14 RX ADMIN — RIFAXIMIN 550 MG: 550 TABLET ORAL at 08:56

## 2020-10-14 RX ADMIN — LACTULOSE 30 G: 10 SOLUTION ORAL at 09:07

## 2020-10-14 RX ADMIN — NICOTINE 1 PATCH: 14 PATCH TRANSDERMAL at 08:57

## 2020-10-14 RX ADMIN — SODIUM CHLORIDE, SODIUM LACTATE, POTASSIUM CHLORIDE, AND CALCIUM CHLORIDE 125 ML/HR: .6; .31; .03; .02 INJECTION, SOLUTION INTRAVENOUS at 15:34

## 2020-10-14 RX ADMIN — LACTULOSE 30 G: 10 SOLUTION ORAL at 17:21

## 2020-10-14 RX ADMIN — LIDOCAINE HYDROCHLORIDE 50 MG: 10 INJECTION, SOLUTION EPIDURAL; INFILTRATION; INTRACAUDAL; PERINEURAL at 13:32

## 2020-10-14 RX ADMIN — SODIUM CHLORIDE, SODIUM LACTATE, POTASSIUM CHLORIDE, AND CALCIUM CHLORIDE 125 ML/HR: .6; .31; .03; .02 INJECTION, SOLUTION INTRAVENOUS at 23:09

## 2020-10-14 RX ADMIN — HEPARIN SODIUM 5000 UNITS: 5000 INJECTION INTRAVENOUS; SUBCUTANEOUS at 14:50

## 2020-10-14 RX ADMIN — HEPARIN SODIUM 5000 UNITS: 5000 INJECTION INTRAVENOUS; SUBCUTANEOUS at 06:12

## 2020-10-14 RX ADMIN — HEPARIN SODIUM 5000 UNITS: 5000 INJECTION INTRAVENOUS; SUBCUTANEOUS at 21:26

## 2020-10-14 RX ADMIN — RIFAXIMIN 550 MG: 550 TABLET ORAL at 21:26

## 2020-10-14 RX ADMIN — SODIUM CHLORIDE, SODIUM LACTATE, POTASSIUM CHLORIDE, AND CALCIUM CHLORIDE 125 ML/HR: .6; .31; .03; .02 INJECTION, SOLUTION INTRAVENOUS at 12:59

## 2020-10-14 RX ADMIN — LACTULOSE 30 G: 10 SOLUTION ORAL at 21:24

## 2020-10-14 RX ADMIN — PHENYLEPHRINE HYDROCHLORIDE 100 MCG: 10 INJECTION INTRAVENOUS at 13:38

## 2020-10-14 RX ADMIN — PROPOFOL 50 MG: 10 INJECTION, EMULSION INTRAVENOUS at 13:34

## 2020-10-14 RX ADMIN — PROPOFOL 50 MG: 10 INJECTION, EMULSION INTRAVENOUS at 13:33

## 2020-10-14 RX ADMIN — PREDNISOLONE SODIUM PHOSPHATE 40 MG: 15 SOLUTION ORAL at 09:24

## 2020-10-14 RX ADMIN — PROPOFOL 100 MG: 10 INJECTION, EMULSION INTRAVENOUS at 13:32

## 2020-10-15 PROBLEM — I21.A1 TYPE 2 MI (MYOCARDIAL INFARCTION) (HCC): Status: RESOLVED | Noted: 2020-05-31 | Resolved: 2020-10-15

## 2020-10-15 PROBLEM — N39.0 URINARY TRACT INFECTION: Status: RESOLVED | Noted: 2020-05-30 | Resolved: 2020-10-15

## 2020-10-15 LAB
ALBUMIN SERPL BCP-MCNC: 2.2 G/DL (ref 3.5–5)
ALP SERPL-CCNC: 76 U/L (ref 46–116)
ALT SERPL W P-5'-P-CCNC: 52 U/L (ref 12–78)
ANION GAP SERPL CALCULATED.3IONS-SCNC: 9 MMOL/L (ref 4–13)
AST SERPL W P-5'-P-CCNC: 68 U/L (ref 5–45)
BASOPHILS # BLD AUTO: 0.03 THOUSANDS/ΜL (ref 0–0.1)
BASOPHILS NFR BLD AUTO: 1 % (ref 0–1)
BILIRUB SERPL-MCNC: 2.9 MG/DL (ref 0.2–1)
BUN SERPL-MCNC: 11 MG/DL (ref 5–25)
CALCIUM ALBUM COR SERPL-MCNC: 9.7 MG/DL (ref 8.3–10.1)
CALCIUM SERPL-MCNC: 8.3 MG/DL (ref 8.3–10.1)
CHLORIDE SERPL-SCNC: 102 MMOL/L (ref 100–108)
CO2 SERPL-SCNC: 26 MMOL/L (ref 21–32)
CREAT SERPL-MCNC: 0.78 MG/DL (ref 0.6–1.3)
EOSINOPHIL # BLD AUTO: 0.07 THOUSAND/ΜL (ref 0–0.61)
EOSINOPHIL NFR BLD AUTO: 1 % (ref 0–6)
ERYTHROCYTE [DISTWIDTH] IN BLOOD BY AUTOMATED COUNT: 16.7 % (ref 11.6–15.1)
GFR SERPL CREATININE-BSD FRML MDRD: 95 ML/MIN/1.73SQ M
GLUCOSE SERPL-MCNC: 182 MG/DL (ref 65–140)
HCT VFR BLD AUTO: 35.2 % (ref 36.5–49.3)
HGB BLD-MCNC: 12.1 G/DL (ref 12–17)
IMM GRANULOCYTES # BLD AUTO: 0.01 THOUSAND/UL (ref 0–0.2)
IMM GRANULOCYTES NFR BLD AUTO: 0 % (ref 0–2)
LYMPHOCYTES # BLD AUTO: 1.87 THOUSANDS/ΜL (ref 0.6–4.47)
LYMPHOCYTES NFR BLD AUTO: 37 % (ref 14–44)
MCH RBC QN AUTO: 37.2 PG (ref 26.8–34.3)
MCHC RBC AUTO-ENTMCNC: 34.4 G/DL (ref 31.4–37.4)
MCV RBC AUTO: 108 FL (ref 82–98)
MONOCYTES # BLD AUTO: 0.57 THOUSAND/ΜL (ref 0.17–1.22)
MONOCYTES NFR BLD AUTO: 11 % (ref 4–12)
NEUTROPHILS # BLD AUTO: 2.47 THOUSANDS/ΜL (ref 1.85–7.62)
NEUTS SEG NFR BLD AUTO: 50 % (ref 43–75)
NRBC BLD AUTO-RTO: 0 /100 WBCS
PLATELET # BLD AUTO: 86 THOUSANDS/UL (ref 149–390)
PMV BLD AUTO: 10.3 FL (ref 8.9–12.7)
POTASSIUM SERPL-SCNC: 4 MMOL/L (ref 3.5–5.3)
PROT SERPL-MCNC: 5.7 G/DL (ref 6.4–8.2)
RBC # BLD AUTO: 3.25 MILLION/UL (ref 3.88–5.62)
SODIUM SERPL-SCNC: 137 MMOL/L (ref 136–145)
WBC # BLD AUTO: 5.02 THOUSAND/UL (ref 4.31–10.16)

## 2020-10-15 PROCEDURE — 99232 SBSQ HOSP IP/OBS MODERATE 35: CPT | Performed by: INTERNAL MEDICINE

## 2020-10-15 PROCEDURE — 94762 N-INVAS EAR/PLS OXIMTRY CONT: CPT

## 2020-10-15 PROCEDURE — 85025 COMPLETE CBC W/AUTO DIFF WBC: CPT | Performed by: INTERNAL MEDICINE

## 2020-10-15 PROCEDURE — 80053 COMPREHEN METABOLIC PANEL: CPT | Performed by: INTERNAL MEDICINE

## 2020-10-15 RX ORDER — PANTOPRAZOLE SODIUM 40 MG/1
40 TABLET, DELAYED RELEASE ORAL
Refills: 0
Start: 2020-10-16

## 2020-10-15 RX ORDER — SPIRONOLACTONE 50 MG/1
50 TABLET, FILM COATED ORAL DAILY
Refills: 0
Start: 2020-10-16

## 2020-10-15 RX ORDER — FUROSEMIDE 20 MG/1
20 TABLET ORAL ONCE
Qty: 1 TABLET | Refills: 0
Start: 2020-10-15 | End: 2020-10-16 | Stop reason: HOSPADM

## 2020-10-15 RX ORDER — LACTULOSE 20 G/30ML
30 SOLUTION ORAL 3 TIMES DAILY
Refills: 0
Start: 2020-10-15

## 2020-10-15 RX ORDER — PREDNISOLONE SODIUM PHOSPHATE 15 MG/5ML
40 SOLUTION ORAL DAILY
Refills: 0
Start: 2020-10-16

## 2020-10-15 RX ORDER — NICOTINE 21 MG/24HR
1 PATCH, TRANSDERMAL 24 HOURS TRANSDERMAL DAILY
Qty: 28 PATCH | Refills: 0
Start: 2020-10-16

## 2020-10-15 RX ADMIN — HEPARIN SODIUM 5000 UNITS: 5000 INJECTION INTRAVENOUS; SUBCUTANEOUS at 05:32

## 2020-10-15 RX ADMIN — HEPARIN SODIUM 5000 UNITS: 5000 INJECTION INTRAVENOUS; SUBCUTANEOUS at 21:45

## 2020-10-15 RX ADMIN — PREDNISOLONE SODIUM PHOSPHATE 40 MG: 15 SOLUTION ORAL at 08:53

## 2020-10-15 RX ADMIN — SODIUM CHLORIDE, SODIUM LACTATE, POTASSIUM CHLORIDE, AND CALCIUM CHLORIDE 125 ML/HR: .6; .31; .03; .02 INJECTION, SOLUTION INTRAVENOUS at 08:48

## 2020-10-15 RX ADMIN — PANTOPRAZOLE SODIUM 40 MG: 40 TABLET, DELAYED RELEASE ORAL at 05:32

## 2020-10-15 RX ADMIN — HEPARIN SODIUM 5000 UNITS: 5000 INJECTION INTRAVENOUS; SUBCUTANEOUS at 14:26

## 2020-10-15 RX ADMIN — LACTULOSE 30 G: 10 SOLUTION ORAL at 08:51

## 2020-10-15 RX ADMIN — NICOTINE 1 PATCH: 14 PATCH TRANSDERMAL at 08:53

## 2020-10-15 RX ADMIN — LACTULOSE 30 G: 10 SOLUTION ORAL at 21:45

## 2020-10-15 RX ADMIN — LACTULOSE 30 G: 10 SOLUTION ORAL at 16:09

## 2020-10-15 RX ADMIN — RIFAXIMIN 550 MG: 550 TABLET ORAL at 21:45

## 2020-10-15 RX ADMIN — RIFAXIMIN 550 MG: 550 TABLET ORAL at 08:53

## 2020-10-15 RX ADMIN — TAMSULOSIN HYDROCHLORIDE 0.4 MG: 0.4 CAPSULE ORAL at 16:09

## 2020-10-16 VITALS
SYSTOLIC BLOOD PRESSURE: 105 MMHG | OXYGEN SATURATION: 90 % | WEIGHT: 240.3 LBS | TEMPERATURE: 98.1 F | HEIGHT: 75 IN | BODY MASS INDEX: 29.88 KG/M2 | DIASTOLIC BLOOD PRESSURE: 70 MMHG | HEART RATE: 88 BPM | RESPIRATION RATE: 19 BRPM

## 2020-10-16 LAB — SARS-COV-2 RNA RESP QL NAA+PROBE: NEGATIVE

## 2020-10-16 PROCEDURE — 97110 THERAPEUTIC EXERCISES: CPT

## 2020-10-16 PROCEDURE — 99239 HOSP IP/OBS DSCHRG MGMT >30: CPT | Performed by: STUDENT IN AN ORGANIZED HEALTH CARE EDUCATION/TRAINING PROGRAM

## 2020-10-16 PROCEDURE — 87635 SARS-COV-2 COVID-19 AMP PRB: CPT | Performed by: STUDENT IN AN ORGANIZED HEALTH CARE EDUCATION/TRAINING PROGRAM

## 2020-10-16 PROCEDURE — 97116 GAIT TRAINING THERAPY: CPT

## 2020-10-16 PROCEDURE — 94762 N-INVAS EAR/PLS OXIMTRY CONT: CPT

## 2020-10-16 RX ADMIN — PANTOPRAZOLE SODIUM 40 MG: 40 TABLET, DELAYED RELEASE ORAL at 05:32

## 2020-10-16 RX ADMIN — LACTULOSE 30 G: 10 SOLUTION ORAL at 08:38

## 2020-10-16 RX ADMIN — PREDNISOLONE SODIUM PHOSPHATE 40 MG: 15 SOLUTION ORAL at 08:43

## 2020-10-16 RX ADMIN — NICOTINE 1 PATCH: 14 PATCH TRANSDERMAL at 08:41

## 2020-10-16 RX ADMIN — TAMSULOSIN HYDROCHLORIDE 0.4 MG: 0.4 CAPSULE ORAL at 16:31

## 2020-10-16 RX ADMIN — RIFAXIMIN 550 MG: 550 TABLET ORAL at 08:42

## 2020-10-16 RX ADMIN — LACTULOSE 30 G: 10 SOLUTION ORAL at 16:31

## 2020-10-16 RX ADMIN — HEPARIN SODIUM 5000 UNITS: 5000 INJECTION INTRAVENOUS; SUBCUTANEOUS at 05:32

## 2020-10-19 ENCOUNTER — TELEPHONE (OUTPATIENT)
Dept: GASTROENTEROLOGY | Facility: CLINIC | Age: 65
End: 2020-10-19

## 2020-10-23 ENCOUNTER — APPOINTMENT (EMERGENCY)
Dept: RADIOLOGY | Facility: HOSPITAL | Age: 65
End: 2020-10-23
Payer: COMMERCIAL

## 2020-10-23 ENCOUNTER — HOSPITAL ENCOUNTER (INPATIENT)
Facility: HOSPITAL | Age: 65
LOS: 2 days | DRG: 871 | End: 2020-10-25
Attending: INTERNAL MEDICINE | Admitting: INTERNAL MEDICINE
Payer: COMMERCIAL

## 2020-10-23 ENCOUNTER — HOSPITAL ENCOUNTER (EMERGENCY)
Facility: HOSPITAL | Age: 65
End: 2020-10-23
Attending: EMERGENCY MEDICINE | Admitting: EMERGENCY MEDICINE
Payer: COMMERCIAL

## 2020-10-23 ENCOUNTER — APPOINTMENT (EMERGENCY)
Dept: CT IMAGING | Facility: HOSPITAL | Age: 65
End: 2020-10-23
Payer: COMMERCIAL

## 2020-10-23 VITALS
SYSTOLIC BLOOD PRESSURE: 86 MMHG | RESPIRATION RATE: 20 BRPM | DIASTOLIC BLOOD PRESSURE: 49 MMHG | OXYGEN SATURATION: 100 % | HEART RATE: 114 BPM | TEMPERATURE: 97.6 F

## 2020-10-23 DIAGNOSIS — N17.9 AKI (ACUTE KIDNEY INJURY) (HCC): Primary | ICD-10-CM

## 2020-10-23 DIAGNOSIS — K70.31 ALCOHOLIC CIRRHOSIS OF LIVER WITH ASCITES (HCC): ICD-10-CM

## 2020-10-23 DIAGNOSIS — I48.91 NEW ONSET A-FIB (HCC): ICD-10-CM

## 2020-10-23 DIAGNOSIS — J90 PLEURAL EFFUSION, LEFT: ICD-10-CM

## 2020-10-23 DIAGNOSIS — D64.9 ANEMIA: ICD-10-CM

## 2020-10-23 DIAGNOSIS — D69.6 THROMBOCYTOPENIA (HCC): ICD-10-CM

## 2020-10-23 PROBLEM — A41.9 SEPTIC SHOCK (HCC): Status: ACTIVE | Noted: 2020-10-23

## 2020-10-23 PROBLEM — E87.2 LACTIC ACIDOSIS: Status: ACTIVE | Noted: 2020-10-23

## 2020-10-23 PROBLEM — R09.02 HYPOXEMIA: Status: ACTIVE | Noted: 2020-10-23

## 2020-10-23 PROBLEM — E87.20 LACTIC ACIDOSIS: Status: ACTIVE | Noted: 2020-10-23

## 2020-10-23 PROBLEM — E87.2 HIGH ANION GAP METABOLIC ACIDOSIS: Status: ACTIVE | Noted: 2020-10-23

## 2020-10-23 PROBLEM — R79.1 ELEVATED INR: Status: ACTIVE | Noted: 2020-10-23

## 2020-10-23 PROBLEM — I85.00 ESOPHAGEAL VARICES (HCC): Status: ACTIVE | Noted: 2020-10-23

## 2020-10-23 PROBLEM — E87.1 HYPONATREMIA: Status: ACTIVE | Noted: 2020-10-23

## 2020-10-23 PROBLEM — R65.21 SEPTIC SHOCK (HCC): Status: ACTIVE | Noted: 2020-10-23

## 2020-10-23 PROBLEM — I95.9 HYPOTENSION: Status: ACTIVE | Noted: 2020-10-23

## 2020-10-23 PROBLEM — J96.00 ACUTE RESPIRATORY FAILURE (HCC): Status: ACTIVE | Noted: 2020-10-23

## 2020-10-23 PROBLEM — D68.9 COAGULOPATHY (HCC): Status: ACTIVE | Noted: 2020-10-23

## 2020-10-23 PROBLEM — E87.29 HIGH ANION GAP METABOLIC ACIDOSIS: Status: ACTIVE | Noted: 2020-10-23

## 2020-10-23 LAB
ABO GROUP BLD: NORMAL
ABO GROUP BLD: NORMAL
ALBUMIN SERPL BCP-MCNC: 2.5 G/DL (ref 3.4–4.8)
ALP SERPL-CCNC: 58.6 U/L (ref 10–129)
ALT SERPL W P-5'-P-CCNC: 73 U/L (ref 5–63)
AMMONIA PLAS-SCNC: 67.6 UMOL/L
ANION GAP SERPL CALCULATED.3IONS-SCNC: 14 MMOL/L (ref 4–13)
APTT PPP: 47 SECONDS (ref 23–37)
APTT PPP: 51 SECONDS (ref 23–31)
ARTERIAL PATENCY WRIST A: YES
AST SERPL W P-5'-P-CCNC: 85 U/L (ref 15–41)
BACTERIA UR QL AUTO: ABNORMAL /HPF
BASE EXCESS BLDA CALC-SCNC: -7.8 MMOL/L
BASOPHILS # BLD AUTO: 0 THOUSANDS/ΜL (ref 0–0.1)
BASOPHILS # BLD AUTO: 0.02 THOUSANDS/ΜL (ref 0–0.1)
BASOPHILS NFR BLD AUTO: 0 % (ref 0–1)
BASOPHILS NFR BLD AUTO: 0 % (ref 0–1)
BILIRUB SERPL-MCNC: 4.71 MG/DL (ref 0.3–1.2)
BILIRUB UR QL STRIP: ABNORMAL
BLD GP AB SCN SERPL QL: NEGATIVE
BLD GP AB SCN SERPL QL: NEGATIVE
BUN SERPL-MCNC: 58 MG/DL (ref 6–20)
CALCIUM ALBUM COR SERPL-MCNC: 9.4 MG/DL (ref 8.3–10.1)
CALCIUM SERPL-MCNC: 8.2 MG/DL (ref 8.4–10.2)
CHLORIDE SERPL-SCNC: 95 MMOL/L (ref 96–108)
CLARITY UR: ABNORMAL
CO2 SERPL-SCNC: 19 MMOL/L (ref 22–33)
COLOR UR: YELLOW
CREAT SERPL-MCNC: 5.48 MG/DL (ref 0.5–1.2)
D DIMER PPP FEU-MCNC: >20 UG/ML FEU
EOSINOPHIL # BLD AUTO: 0 THOUSAND/ΜL (ref 0–0.61)
EOSINOPHIL # BLD AUTO: 0.01 THOUSAND/ΜL (ref 0–0.61)
EOSINOPHIL NFR BLD AUTO: 0 % (ref 0–6)
EOSINOPHIL NFR BLD AUTO: 0 % (ref 0–6)
ERYTHROCYTE [DISTWIDTH] IN BLOOD BY AUTOMATED COUNT: 17.3 % (ref 11.6–15.1)
ERYTHROCYTE [DISTWIDTH] IN BLOOD BY AUTOMATED COUNT: 18.7 % (ref 11.6–15.1)
ERYTHROCYTE [DISTWIDTH] IN BLOOD BY AUTOMATED COUNT: 18.9 % (ref 11.6–15.1)
FIBRINOGEN PPP-MCNC: <60 MG/DL (ref 227–495)
GFR SERPL CREATININE-BSD FRML MDRD: 10 ML/MIN/1.73SQ M
GLUCOSE SERPL-MCNC: 220 MG/DL (ref 65–140)
GLUCOSE UR STRIP-MCNC: ABNORMAL MG/DL
HCO3 BLDA-SCNC: 14.1 MMOL/L (ref 22–28)
HCT VFR BLD AUTO: 25 % (ref 36.5–49.3)
HCT VFR BLD AUTO: 37.5 % (ref 36.5–49.3)
HCT VFR BLD AUTO: 38 % (ref 36.5–49.3)
HEMOCCULT STL QL IA: NEGATIVE
HFNC FLOW LPM: 40
HGB BLD-MCNC: 13 G/DL (ref 12–17)
HGB BLD-MCNC: 13.1 G/DL (ref 12–17)
HGB BLD-MCNC: 8.8 G/DL (ref 12–17)
HGB UR QL STRIP.AUTO: ABNORMAL
IMM GRANULOCYTES # BLD AUTO: 0.01 THOUSAND/UL (ref 0–0.2)
IMM GRANULOCYTES # BLD AUTO: 0.05 THOUSAND/UL (ref 0–0.2)
IMM GRANULOCYTES NFR BLD AUTO: 0 % (ref 0–2)
IMM GRANULOCYTES NFR BLD AUTO: 0 % (ref 0–2)
INR PPP: 3.33 (ref 0.84–1.19)
INR PPP: >9.9 (ref 0.9–1.1)
KETONES UR STRIP-MCNC: ABNORMAL MG/DL
LACTATE SERPL-SCNC: 2.6 MMOL/L (ref 0–2)
LACTATE SERPL-SCNC: 3.7 MMOL/L (ref 0.5–2)
LACTATE SERPL-SCNC: 4.9 MMOL/L (ref 0.5–2)
LEUKOCYTE ESTERASE UR QL STRIP: NEGATIVE
LYMPHOCYTES # BLD AUTO: 1.11 THOUSANDS/ΜL (ref 0.6–4.47)
LYMPHOCYTES # BLD AUTO: 1.73 THOUSANDS/ΜL (ref 0.6–4.47)
LYMPHOCYTES NFR BLD AUTO: 16 % (ref 14–44)
LYMPHOCYTES NFR BLD AUTO: 19 % (ref 14–44)
LYMPHOCYTES NFR BLD: 17 % (ref 14–44)
MAGNESIUM SERPL-MCNC: 2.2 MG/DL (ref 1.6–2.6)
MCH RBC QN AUTO: 37.3 PG (ref 26.8–34.3)
MCH RBC QN AUTO: 37.8 PG (ref 26.8–34.3)
MCH RBC QN AUTO: 37.8 PG (ref 26.8–34.3)
MCHC RBC AUTO-ENTMCNC: 34.5 G/DL (ref 31.4–37.4)
MCHC RBC AUTO-ENTMCNC: 34.7 G/DL (ref 31.4–37.4)
MCHC RBC AUTO-ENTMCNC: 35.2 G/DL (ref 31.4–37.4)
MCV RBC AUTO: 106 FL (ref 82–98)
MCV RBC AUTO: 109 FL (ref 82–98)
MCV RBC AUTO: 110 FL (ref 82–98)
MONOCYTES # BLD AUTO: 0.45 THOUSAND/ΜL (ref 0.17–1.22)
MONOCYTES # BLD AUTO: 0.65 THOUSAND/ΜL (ref 0.17–1.22)
MONOCYTES NFR BLD AUTO: 4 % (ref 4–12)
MONOCYTES NFR BLD AUTO: 6 % (ref 4–12)
MONOCYTES NFR BLD AUTO: 8 % (ref 4–12)
NEUTROPHILS # BLD AUTO: 4.42 THOUSANDS/ΜL (ref 1.85–7.62)
NEUTROPHILS # BLD AUTO: 8.72 THOUSANDS/ΜL (ref 1.85–7.62)
NEUTS BAND NFR BLD MANUAL: 2 THOUSAND/UL
NEUTS SEG NFR BLD AUTO: 73 % (ref 43–75)
NEUTS SEG NFR BLD AUTO: 77 % (ref 45–77)
NEUTS SEG NFR BLD AUTO: 78 % (ref 43–75)
NITRITE UR QL STRIP: POSITIVE
NON VENT HFNC FIO2: 50
NON VENT TYPE HFNC: ABNORMAL
NON-SQ EPI CELLS URNS QL MICRO: ABNORMAL /HPF
NRBC BLD AUTO-RTO: 0 /100 WBCS
NT-PROBNP SERPL-MCNC: ABNORMAL PG/ML
O2 CT BLDA-SCNC: 18.1 ML/DL (ref 16–23)
OSMOLALITY UR: 312 MMOL/KG
OXYHGB MFR BLDA: 92.9 % (ref 94–97)
PCO2 BLDA: 21.2 MM HG (ref 36–44)
PH BLDA: 7.44 [PH] (ref 7.35–7.45)
PH UR STRIP.AUTO: 5 [PH]
PHOSPHATE SERPL-MCNC: 6.9 MG/DL (ref 2.3–4.1)
PLATELET # BLD AUTO: 102 THOUSANDS/UL (ref 149–390)
PLATELET # BLD AUTO: 34 THOUSANDS/UL (ref 149–390)
PLATELET # BLD AUTO: 57 THOUSANDS/UL (ref 149–390)
PLATELET BLD QL SMEAR: ABNORMAL
PMV BLD AUTO: 10.7 FL (ref 8.9–12.7)
PMV BLD AUTO: 11.1 FL (ref 8.9–12.7)
PMV BLD AUTO: 12.1 FL (ref 8.9–12.7)
PO2 BLDA: 76.3 MM HG (ref 75–129)
POIKILOCYTOSIS BLD QL SMEAR: PRESENT
POLYCHROMASIA BLD QL SMEAR: PRESENT
POTASSIUM SERPL-SCNC: 5 MMOL/L (ref 3.5–5)
PROT SERPL-MCNC: 5.2 G/DL (ref 6.4–8.3)
PROT UR STRIP-MCNC: >=300 MG/DL
PROTHROMBIN TIME: 33.8 SECONDS (ref 11.6–14.5)
PROTHROMBIN TIME: >90 SECONDS (ref 9.5–12.1)
RBC # BLD AUTO: 2.36 MILLION/UL (ref 3.88–5.62)
RBC # BLD AUTO: 3.44 MILLION/UL (ref 3.88–5.62)
RBC # BLD AUTO: 3.47 MILLION/UL (ref 3.88–5.62)
RBC #/AREA URNS AUTO: ABNORMAL /HPF
RH BLD: POSITIVE
RH BLD: POSITIVE
SARS-COV-2 RNA RESP QL NAA+PROBE: NEGATIVE
SODIUM SERPL-SCNC: 128 MMOL/L (ref 133–145)
SP GR UR STRIP.AUTO: 1.02 (ref 1–1.03)
SPECIMEN EXPIRATION DATE: NORMAL
SPECIMEN EXPIRATION DATE: NORMAL
SPECIMEN SOURCE: ABNORMAL
TOTAL CELLS COUNTED SPEC: 100
TROPONIN I SERPL-MCNC: 0.44 NG/ML (ref 0–0.07)
TROPONIN I SERPL-MCNC: 0.6 NG/ML
TROPONIN I SERPL-MCNC: 0.67 NG/ML
URINE COMMENT: ABNORMAL
UROBILINOGEN UR QL STRIP.AUTO: 1 E.U./DL
WBC # BLD AUTO: 11.18 THOUSAND/UL (ref 4.31–10.16)
WBC # BLD AUTO: 12.59 THOUSAND/UL (ref 4.31–10.16)
WBC # BLD AUTO: 5.99 THOUSAND/UL (ref 4.31–10.16)
WBC #/AREA URNS AUTO: ABNORMAL /HPF

## 2020-10-23 PROCEDURE — 85007 BL SMEAR W/DIFF WBC COUNT: CPT | Performed by: PHYSICIAN ASSISTANT

## 2020-10-23 PROCEDURE — 85610 PROTHROMBIN TIME: CPT | Performed by: PHYSICIAN ASSISTANT

## 2020-10-23 PROCEDURE — 86901 BLOOD TYPING SEROLOGIC RH(D): CPT | Performed by: PHYSICIAN ASSISTANT

## 2020-10-23 PROCEDURE — C9113 INJ PANTOPRAZOLE SODIUM, VIA: HCPCS | Performed by: INTERNAL MEDICINE

## 2020-10-23 PROCEDURE — 71045 X-RAY EXAM CHEST 1 VIEW: CPT

## 2020-10-23 PROCEDURE — 86901 BLOOD TYPING SEROLOGIC RH(D): CPT | Performed by: PSYCHIATRY & NEUROLOGY

## 2020-10-23 PROCEDURE — 83735 ASSAY OF MAGNESIUM: CPT | Performed by: PHYSICIAN ASSISTANT

## 2020-10-23 PROCEDURE — 80053 COMPREHEN METABOLIC PANEL: CPT | Performed by: PHYSICIAN ASSISTANT

## 2020-10-23 PROCEDURE — 85027 COMPLETE CBC AUTOMATED: CPT | Performed by: INTERNAL MEDICINE

## 2020-10-23 PROCEDURE — 86900 BLOOD TYPING SEROLOGIC ABO: CPT | Performed by: PHYSICIAN ASSISTANT

## 2020-10-23 PROCEDURE — 85025 COMPLETE CBC W/AUTO DIFF WBC: CPT | Performed by: PSYCHIATRY & NEUROLOGY

## 2020-10-23 PROCEDURE — 83935 ASSAY OF URINE OSMOLALITY: CPT | Performed by: INTERNAL MEDICINE

## 2020-10-23 PROCEDURE — 85610 PROTHROMBIN TIME: CPT | Performed by: INTERNAL MEDICINE

## 2020-10-23 PROCEDURE — 96366 THER/PROPH/DIAG IV INF ADDON: CPT

## 2020-10-23 PROCEDURE — 83605 ASSAY OF LACTIC ACID: CPT | Performed by: EMERGENCY MEDICINE

## 2020-10-23 PROCEDURE — 30243M1 TRANSFUSION OF NONAUTOLOGOUS PLASMA CRYOPRECIPITATE INTO CENTRAL VEIN, PERCUTANEOUS APPROACH: ICD-10-PCS | Performed by: INTERNAL MEDICINE

## 2020-10-23 PROCEDURE — 85730 THROMBOPLASTIN TIME PARTIAL: CPT | Performed by: PHYSICIAN ASSISTANT

## 2020-10-23 PROCEDURE — 85379 FIBRIN DEGRADATION QUANT: CPT | Performed by: PSYCHIATRY & NEUROLOGY

## 2020-10-23 PROCEDURE — 99285 EMERGENCY DEPT VISIT HI MDM: CPT

## 2020-10-23 PROCEDURE — G1004 CDSM NDSC: HCPCS

## 2020-10-23 PROCEDURE — 84100 ASSAY OF PHOSPHORUS: CPT | Performed by: PHYSICIAN ASSISTANT

## 2020-10-23 PROCEDURE — 87635 SARS-COV-2 COVID-19 AMP PRB: CPT | Performed by: INTERNAL MEDICINE

## 2020-10-23 PROCEDURE — 80048 BASIC METABOLIC PNL TOTAL CA: CPT | Performed by: INTERNAL MEDICINE

## 2020-10-23 PROCEDURE — 85025 COMPLETE CBC W/AUTO DIFF WBC: CPT | Performed by: PHYSICIAN ASSISTANT

## 2020-10-23 PROCEDURE — 96374 THER/PROPH/DIAG INJ IV PUSH: CPT

## 2020-10-23 PROCEDURE — 93005 ELECTROCARDIOGRAM TRACING: CPT

## 2020-10-23 PROCEDURE — 96365 THER/PROPH/DIAG IV INF INIT: CPT

## 2020-10-23 PROCEDURE — 82805 BLOOD GASES W/O2 SATURATION: CPT | Performed by: PSYCHIATRY & NEUROLOGY

## 2020-10-23 PROCEDURE — 82140 ASSAY OF AMMONIA: CPT | Performed by: PHYSICIAN ASSISTANT

## 2020-10-23 PROCEDURE — 85730 THROMBOPLASTIN TIME PARTIAL: CPT | Performed by: INTERNAL MEDICINE

## 2020-10-23 PROCEDURE — 87040 BLOOD CULTURE FOR BACTERIA: CPT | Performed by: PHYSICIAN ASSISTANT

## 2020-10-23 PROCEDURE — 99285 EMERGENCY DEPT VISIT HI MDM: CPT | Performed by: PHYSICIAN ASSISTANT

## 2020-10-23 PROCEDURE — 87040 BLOOD CULTURE FOR BACTERIA: CPT | Performed by: PSYCHIATRY & NEUROLOGY

## 2020-10-23 PROCEDURE — G0328 FECAL BLOOD SCRN IMMUNOASSAY: HCPCS | Performed by: EMERGENCY MEDICINE

## 2020-10-23 PROCEDURE — 83605 ASSAY OF LACTIC ACID: CPT | Performed by: PSYCHIATRY & NEUROLOGY

## 2020-10-23 PROCEDURE — 99291 CRITICAL CARE FIRST HOUR: CPT | Performed by: INTERNAL MEDICINE

## 2020-10-23 PROCEDURE — 84300 ASSAY OF URINE SODIUM: CPT | Performed by: INTERNAL MEDICINE

## 2020-10-23 PROCEDURE — 81001 URINALYSIS AUTO W/SCOPE: CPT | Performed by: PHYSICIAN ASSISTANT

## 2020-10-23 PROCEDURE — 84484 ASSAY OF TROPONIN QUANT: CPT | Performed by: PSYCHIATRY & NEUROLOGY

## 2020-10-23 PROCEDURE — 36600 WITHDRAWAL OF ARTERIAL BLOOD: CPT

## 2020-10-23 PROCEDURE — 87040 BLOOD CULTURE FOR BACTERIA: CPT | Performed by: NURSE PRACTITIONER

## 2020-10-23 PROCEDURE — 94002 VENT MGMT INPAT INIT DAY: CPT

## 2020-10-23 PROCEDURE — 94762 N-INVAS EAR/PLS OXIMTRY CONT: CPT

## 2020-10-23 PROCEDURE — 86900 BLOOD TYPING SEROLOGIC ABO: CPT | Performed by: PSYCHIATRY & NEUROLOGY

## 2020-10-23 PROCEDURE — 83880 ASSAY OF NATRIURETIC PEPTIDE: CPT | Performed by: PSYCHIATRY & NEUROLOGY

## 2020-10-23 PROCEDURE — 86920 COMPATIBILITY TEST SPIN: CPT

## 2020-10-23 PROCEDURE — 94760 N-INVAS EAR/PLS OXIMETRY 1: CPT

## 2020-10-23 PROCEDURE — 86850 RBC ANTIBODY SCREEN: CPT | Performed by: PSYCHIATRY & NEUROLOGY

## 2020-10-23 PROCEDURE — 96361 HYDRATE IV INFUSION ADD-ON: CPT

## 2020-10-23 PROCEDURE — 84484 ASSAY OF TROPONIN QUANT: CPT | Performed by: PHYSICIAN ASSISTANT

## 2020-10-23 PROCEDURE — 83930 ASSAY OF BLOOD OSMOLALITY: CPT | Performed by: INTERNAL MEDICINE

## 2020-10-23 PROCEDURE — 86850 RBC ANTIBODY SCREEN: CPT | Performed by: PHYSICIAN ASSISTANT

## 2020-10-23 PROCEDURE — 85384 FIBRINOGEN ACTIVITY: CPT | Performed by: INTERNAL MEDICINE

## 2020-10-23 PROCEDURE — 74176 CT ABD & PELVIS W/O CONTRAST: CPT

## 2020-10-23 PROCEDURE — 36415 COLL VENOUS BLD VENIPUNCTURE: CPT | Performed by: PHYSICIAN ASSISTANT

## 2020-10-23 PROCEDURE — 84145 PROCALCITONIN (PCT): CPT | Performed by: PSYCHIATRY & NEUROLOGY

## 2020-10-23 RX ORDER — DILTIAZEM HYDROCHLORIDE 5 MG/ML
10 INJECTION INTRAVENOUS ONCE
Status: COMPLETED | OUTPATIENT
Start: 2020-10-23 | End: 2020-10-23

## 2020-10-23 RX ORDER — MIDODRINE HYDROCHLORIDE 5 MG/1
10 TABLET ORAL
Status: DISCONTINUED | OUTPATIENT
Start: 2020-10-23 | End: 2020-10-24

## 2020-10-23 RX ORDER — CHLORHEXIDINE GLUCONATE 0.12 MG/ML
15 RINSE ORAL EVERY 12 HOURS SCHEDULED
Status: DISCONTINUED | OUTPATIENT
Start: 2020-10-23 | End: 2020-10-24

## 2020-10-23 RX ORDER — ALBUMIN (HUMAN) 12.5 G/50ML
12.5 SOLUTION INTRAVENOUS ONCE
Status: COMPLETED | OUTPATIENT
Start: 2020-10-23 | End: 2020-10-23

## 2020-10-23 RX ORDER — ALBUMIN (HUMAN) 12.5 G/50ML
12.5 SOLUTION INTRAVENOUS EVERY 6 HOURS SCHEDULED
Status: DISCONTINUED | OUTPATIENT
Start: 2020-10-24 | End: 2020-10-24

## 2020-10-23 RX ORDER — PANTOPRAZOLE SODIUM 40 MG/1
40 INJECTION, POWDER, FOR SOLUTION INTRAVENOUS EVERY 12 HOURS SCHEDULED
Status: DISCONTINUED | OUTPATIENT
Start: 2020-10-23 | End: 2020-10-24

## 2020-10-23 RX ORDER — TAMSULOSIN HYDROCHLORIDE 0.4 MG/1
0.4 CAPSULE ORAL
Status: DISCONTINUED | OUTPATIENT
Start: 2020-10-23 | End: 2020-10-23

## 2020-10-23 RX ORDER — NOREPINEPHRINE BITARTRATE 1 MG/ML
INJECTION, SOLUTION INTRAVENOUS
Status: DISCONTINUED
Start: 2020-10-23 | End: 2020-10-23 | Stop reason: HOSPADM

## 2020-10-23 RX ORDER — LACTULOSE 20 G/30ML
30 SOLUTION ORAL 3 TIMES DAILY
Status: DISCONTINUED | OUTPATIENT
Start: 2020-10-23 | End: 2020-10-24

## 2020-10-23 RX ORDER — LIDOCAINE HYDROCHLORIDE 10 MG/ML
INJECTION, SOLUTION EPIDURAL; INFILTRATION; INTRACAUDAL; PERINEURAL
Status: DISCONTINUED
Start: 2020-10-23 | End: 2020-10-24 | Stop reason: WASHOUT

## 2020-10-23 RX ORDER — VANCOMYCIN HYDROCHLORIDE 1 G/200ML
10 INJECTION, SOLUTION INTRAVENOUS DAILY PRN
Status: DISCONTINUED | OUTPATIENT
Start: 2020-10-23 | End: 2020-10-24

## 2020-10-23 RX ORDER — ALBUMIN, HUMAN INJ 5% 5 %
12.5 SOLUTION INTRAVENOUS EVERY 6 HOURS
Status: DISCONTINUED | OUTPATIENT
Start: 2020-10-23 | End: 2020-10-23

## 2020-10-23 RX ORDER — NICOTINE 21 MG/24HR
1 PATCH, TRANSDERMAL 24 HOURS TRANSDERMAL DAILY
Status: DISCONTINUED | OUTPATIENT
Start: 2020-10-24 | End: 2020-10-25 | Stop reason: HOSPADM

## 2020-10-23 RX ADMIN — ALBUMIN (HUMAN) 12.5 G: 12.5 INJECTION, SOLUTION INTRAVENOUS at 18:25

## 2020-10-23 RX ADMIN — MIDODRINE HYDROCHLORIDE 10 MG: 5 TABLET ORAL at 22:02

## 2020-10-23 RX ADMIN — CHLORHEXIDINE GLUCONATE 0.12% ORAL RINSE 15 ML: 1.2 LIQUID ORAL at 22:00

## 2020-10-23 RX ADMIN — NOREPINEPHRINE BITARTRATE 5 MCG/MIN: 1 INJECTION, SOLUTION, CONCENTRATE INTRAVENOUS at 15:43

## 2020-10-23 RX ADMIN — VASOPRESSIN 0.03 UNITS/MIN: 20 INJECTION INTRAVENOUS at 18:08

## 2020-10-23 RX ADMIN — RIFAXIMIN 550 MG: 550 TABLET ORAL at 22:00

## 2020-10-23 RX ADMIN — DILTIAZEM HYDROCHLORIDE 5 MG/HR: 5 INJECTION INTRAVENOUS at 14:40

## 2020-10-23 RX ADMIN — VANCOMYCIN HYDROCHLORIDE 2000 MG: 1 INJECTION, POWDER, LYOPHILIZED, FOR SOLUTION INTRAVENOUS at 22:30

## 2020-10-23 RX ADMIN — NOREPINEPHRINE BITARTRATE 15 MCG/MIN: 1 INJECTION INTRAVENOUS at 21:20

## 2020-10-23 RX ADMIN — PANTOPRAZOLE SODIUM 40 MG: 40 INJECTION, POWDER, FOR SOLUTION INTRAVENOUS at 21:55

## 2020-10-23 RX ADMIN — SODIUM CHLORIDE 1000 ML: 0.9 INJECTION, SOLUTION INTRAVENOUS at 14:00

## 2020-10-23 RX ADMIN — DILTIAZEM HYDROCHLORIDE 10 MG: 5 INJECTION INTRAVENOUS at 14:05

## 2020-10-23 RX ADMIN — PHYTONADIONE 10 MG: 10 INJECTION, EMULSION INTRAMUSCULAR; INTRAVENOUS; SUBCUTANEOUS at 18:17

## 2020-10-23 RX ADMIN — LACTULOSE 30 G: 10 SOLUTION ORAL at 21:50

## 2020-10-23 RX ADMIN — ALBUMIN (HUMAN) 12.5 G: 0.25 INJECTION, SOLUTION INTRAVENOUS at 22:01

## 2020-10-24 PROBLEM — N17.9 ACUTE RENAL FAILURE (HCC): Status: ACTIVE | Noted: 2020-10-24

## 2020-10-24 PROBLEM — E87.5 HYPERKALEMIA: Status: ACTIVE | Noted: 2020-10-24

## 2020-10-24 PROBLEM — R73.9 HYPERGLYCEMIA: Status: ACTIVE | Noted: 2020-10-24

## 2020-10-24 LAB
ALBUMIN SERPL BCP-MCNC: 3.3 G/DL (ref 3.5–5)
ALP SERPL-CCNC: 64 U/L (ref 46–116)
ALT SERPL W P-5'-P-CCNC: 95 U/L (ref 12–78)
ANION GAP SERPL CALCULATED.3IONS-SCNC: 15 MMOL/L (ref 4–13)
ANION GAP SERPL CALCULATED.3IONS-SCNC: 19 MMOL/L (ref 4–13)
ANION GAP SERPL CALCULATED.3IONS-SCNC: 20 MMOL/L (ref 4–13)
AST SERPL W P-5'-P-CCNC: 94 U/L (ref 5–45)
BASOPHILS # BLD AUTO: 0.01 THOUSANDS/ΜL (ref 0–0.1)
BASOPHILS NFR BLD AUTO: 0 % (ref 0–1)
BETA-HYDROXYBUTYRATE: 0.3 MMOL/L
BILIRUB DIRECT SERPL-MCNC: 2.62 MG/DL (ref 0–0.2)
BILIRUB SERPL-MCNC: 5.82 MG/DL (ref 0.2–1)
BUN SERPL-MCNC: 60 MG/DL (ref 5–25)
BUN SERPL-MCNC: 61 MG/DL (ref 5–25)
BUN SERPL-MCNC: 62 MG/DL (ref 5–25)
CALCIUM SERPL-MCNC: 7.9 MG/DL (ref 8.3–10.1)
CALCIUM SERPL-MCNC: 8.4 MG/DL (ref 8.3–10.1)
CALCIUM SERPL-MCNC: 8.5 MG/DL (ref 8.3–10.1)
CHLORIDE SERPL-SCNC: 94 MMOL/L (ref 100–108)
CHLORIDE SERPL-SCNC: 95 MMOL/L (ref 100–108)
CHLORIDE SERPL-SCNC: 95 MMOL/L (ref 100–108)
CO2 SERPL-SCNC: 16 MMOL/L (ref 21–32)
CO2 SERPL-SCNC: 17 MMOL/L (ref 21–32)
CO2 SERPL-SCNC: 17 MMOL/L (ref 21–32)
CREAT SERPL-MCNC: 6.07 MG/DL (ref 0.6–1.3)
CREAT SERPL-MCNC: 6.2 MG/DL (ref 0.6–1.3)
CREAT SERPL-MCNC: 6.43 MG/DL (ref 0.6–1.3)
EOSINOPHIL # BLD AUTO: 0 THOUSAND/ΜL (ref 0–0.61)
EOSINOPHIL NFR BLD AUTO: 0 % (ref 0–6)
ERYTHROCYTE [DISTWIDTH] IN BLOOD BY AUTOMATED COUNT: 19.3 % (ref 11.6–15.1)
GFR SERPL CREATININE-BSD FRML MDRD: 8 ML/MIN/1.73SQ M
GFR SERPL CREATININE-BSD FRML MDRD: 9 ML/MIN/1.73SQ M
GFR SERPL CREATININE-BSD FRML MDRD: 9 ML/MIN/1.73SQ M
GLUCOSE SERPL-MCNC: 242 MG/DL (ref 65–140)
GLUCOSE SERPL-MCNC: 290 MG/DL (ref 65–140)
GLUCOSE SERPL-MCNC: 350 MG/DL (ref 65–140)
HCT VFR BLD AUTO: 35.6 % (ref 36.5–49.3)
HGB BLD-MCNC: 12 G/DL (ref 12–17)
IMM GRANULOCYTES # BLD AUTO: 0.04 THOUSAND/UL (ref 0–0.2)
IMM GRANULOCYTES NFR BLD AUTO: 0 % (ref 0–2)
INR PPP: 4.57 (ref 0.84–1.19)
LACTATE SERPL-SCNC: 5.7 MMOL/L (ref 0.5–2)
LACTATE SERPL-SCNC: 6.2 MMOL/L (ref 0.5–2)
LYMPHOCYTES # BLD AUTO: 1.13 THOUSANDS/ΜL (ref 0.6–4.47)
LYMPHOCYTES NFR BLD AUTO: 12 % (ref 14–44)
MCH RBC QN AUTO: 37.6 PG (ref 26.8–34.3)
MCHC RBC AUTO-ENTMCNC: 33.7 G/DL (ref 31.4–37.4)
MCV RBC AUTO: 112 FL (ref 82–98)
MONOCYTES # BLD AUTO: 0.6 THOUSAND/ΜL (ref 0.17–1.22)
MONOCYTES NFR BLD AUTO: 6 % (ref 4–12)
NEUTROPHILS # BLD AUTO: 7.83 THOUSANDS/ΜL (ref 1.85–7.62)
NEUTS SEG NFR BLD AUTO: 82 % (ref 43–75)
NRBC BLD AUTO-RTO: 1 /100 WBCS
OSMOLALITY UR/SERPL-RTO: 304 MMOL/KG (ref 282–298)
PLATELET # BLD AUTO: 60 THOUSANDS/UL (ref 149–390)
PMV BLD AUTO: 11.1 FL (ref 8.9–12.7)
POTASSIUM SERPL-SCNC: 4.9 MMOL/L (ref 3.5–5.3)
POTASSIUM SERPL-SCNC: 5.3 MMOL/L (ref 3.5–5.3)
POTASSIUM SERPL-SCNC: 6.8 MMOL/L (ref 3.5–5.3)
PROCALCITONIN SERPL-MCNC: 0.25 NG/ML
PROCALCITONIN SERPL-MCNC: 0.29 NG/ML
PROT SERPL-MCNC: 6.3 G/DL (ref 6.4–8.2)
PROTHROMBIN TIME: 43.2 SECONDS (ref 11.6–14.5)
RBC # BLD AUTO: 3.19 MILLION/UL (ref 3.88–5.62)
SODIUM 24H UR-SCNC: 17 MOL/L
SODIUM SERPL-SCNC: 127 MMOL/L (ref 136–145)
SODIUM SERPL-SCNC: 130 MMOL/L (ref 136–145)
SODIUM SERPL-SCNC: 131 MMOL/L (ref 136–145)
TROPONIN I SERPL-MCNC: 0.7 NG/ML
WBC # BLD AUTO: 9.61 THOUSAND/UL (ref 4.31–10.16)

## 2020-10-24 PROCEDURE — 80076 HEPATIC FUNCTION PANEL: CPT | Performed by: PHYSICIAN ASSISTANT

## 2020-10-24 PROCEDURE — 99291 CRITICAL CARE FIRST HOUR: CPT | Performed by: INTERNAL MEDICINE

## 2020-10-24 PROCEDURE — 85610 PROTHROMBIN TIME: CPT | Performed by: INTERNAL MEDICINE

## 2020-10-24 PROCEDURE — 83605 ASSAY OF LACTIC ACID: CPT | Performed by: PHYSICIAN ASSISTANT

## 2020-10-24 PROCEDURE — 84145 PROCALCITONIN (PCT): CPT | Performed by: PSYCHIATRY & NEUROLOGY

## 2020-10-24 PROCEDURE — C9113 INJ PANTOPRAZOLE SODIUM, VIA: HCPCS | Performed by: INTERNAL MEDICINE

## 2020-10-24 PROCEDURE — 84484 ASSAY OF TROPONIN QUANT: CPT | Performed by: PHYSICIAN ASSISTANT

## 2020-10-24 PROCEDURE — P9012 CRYOPRECIPITATE EACH UNIT: HCPCS

## 2020-10-24 PROCEDURE — 94760 N-INVAS EAR/PLS OXIMETRY 1: CPT

## 2020-10-24 PROCEDURE — 80048 BASIC METABOLIC PNL TOTAL CA: CPT | Performed by: PHYSICIAN ASSISTANT

## 2020-10-24 PROCEDURE — 85025 COMPLETE CBC W/AUTO DIFF WBC: CPT | Performed by: INTERNAL MEDICINE

## 2020-10-24 PROCEDURE — 82010 KETONE BODYS QUAN: CPT | Performed by: PHYSICIAN ASSISTANT

## 2020-10-24 PROCEDURE — 94003 VENT MGMT INPAT SUBQ DAY: CPT

## 2020-10-24 RX ORDER — ALBUMIN, HUMAN INJ 5% 5 %
12.5 SOLUTION INTRAVENOUS EVERY 6 HOURS SCHEDULED
Status: DISCONTINUED | OUTPATIENT
Start: 2020-10-24 | End: 2020-10-24

## 2020-10-24 RX ORDER — HYDROMORPHONE HCL/PF 1 MG/ML
0.5 SYRINGE (ML) INJECTION
Status: DISCONTINUED | OUTPATIENT
Start: 2020-10-24 | End: 2020-10-25 | Stop reason: HOSPADM

## 2020-10-24 RX ORDER — CALCIUM GLUCONATE 20 MG/ML
2 INJECTION, SOLUTION INTRAVENOUS ONCE
Status: COMPLETED | OUTPATIENT
Start: 2020-10-24 | End: 2020-10-24

## 2020-10-24 RX ORDER — AMIODARONE HYDROCHLORIDE 50 MG/ML
INJECTION, SOLUTION INTRAVENOUS
Status: DISPENSED
Start: 2020-10-24 | End: 2020-10-24

## 2020-10-24 RX ORDER — METHYLPREDNISOLONE SODIUM SUCCINATE 40 MG/ML
40 INJECTION, POWDER, LYOPHILIZED, FOR SOLUTION INTRAMUSCULAR; INTRAVENOUS DAILY
Status: DISCONTINUED | OUTPATIENT
Start: 2020-10-24 | End: 2020-10-24

## 2020-10-24 RX ORDER — METHYLPREDNISOLONE SODIUM SUCCINATE 125 MG/2ML
50 INJECTION, POWDER, LYOPHILIZED, FOR SOLUTION INTRAMUSCULAR; INTRAVENOUS EVERY 8 HOURS SCHEDULED
Status: DISCONTINUED | OUTPATIENT
Start: 2020-10-24 | End: 2020-10-24

## 2020-10-24 RX ORDER — GLYCOPYRROLATE 0.2 MG/ML
0.2 INJECTION INTRAMUSCULAR; INTRAVENOUS
Status: DISCONTINUED | OUTPATIENT
Start: 2020-10-24 | End: 2020-10-25 | Stop reason: HOSPADM

## 2020-10-24 RX ORDER — OCTREOTIDE ACETATE 100 UG/ML
100 INJECTION, SOLUTION INTRAVENOUS; SUBCUTANEOUS EVERY 8 HOURS SCHEDULED
Status: DISCONTINUED | OUTPATIENT
Start: 2020-10-24 | End: 2020-10-24

## 2020-10-24 RX ORDER — DEXTROSE MONOHYDRATE 25 G/50ML
50 INJECTION, SOLUTION INTRAVENOUS ONCE
Status: COMPLETED | OUTPATIENT
Start: 2020-10-24 | End: 2020-10-24

## 2020-10-24 RX ORDER — LIDOCAINE HYDROCHLORIDE 10 MG/ML
INJECTION, SOLUTION EPIDURAL; INFILTRATION; INTRACAUDAL; PERINEURAL
Status: COMPLETED
Start: 2020-10-24 | End: 2020-10-24

## 2020-10-24 RX ORDER — ALBUMIN, HUMAN INJ 5% 5 %
25 SOLUTION INTRAVENOUS ONCE
Status: COMPLETED | OUTPATIENT
Start: 2020-10-24 | End: 2020-10-24

## 2020-10-24 RX ORDER — HEPARIN SODIUM 5000 [USP'U]/ML
5000 INJECTION, SOLUTION INTRAVENOUS; SUBCUTANEOUS EVERY 8 HOURS SCHEDULED
Status: DISCONTINUED | OUTPATIENT
Start: 2020-10-24 | End: 2020-10-24

## 2020-10-24 RX ORDER — METOPROLOL TARTRATE 5 MG/5ML
5 INJECTION INTRAVENOUS ONCE
Status: COMPLETED | OUTPATIENT
Start: 2020-10-24 | End: 2020-10-24

## 2020-10-24 RX ORDER — LORAZEPAM 2 MG/ML
0.5 INJECTION INTRAMUSCULAR
Status: DISCONTINUED | OUTPATIENT
Start: 2020-10-24 | End: 2020-10-25 | Stop reason: HOSPADM

## 2020-10-24 RX ADMIN — METHYLPREDNISOLONE SODIUM SUCCINATE 40 MG: 40 INJECTION, POWDER, FOR SOLUTION INTRAMUSCULAR; INTRAVENOUS at 08:57

## 2020-10-24 RX ADMIN — CHLORHEXIDINE GLUCONATE 0.12% ORAL RINSE 15 ML: 1.2 LIQUID ORAL at 08:57

## 2020-10-24 RX ADMIN — AMIODARONE HYDROCHLORIDE 1 MG/MIN: 50 INJECTION, SOLUTION INTRAVENOUS at 01:34

## 2020-10-24 RX ADMIN — HYDROMORPHONE HYDROCHLORIDE 0.5 MG: 1 INJECTION, SOLUTION INTRAMUSCULAR; INTRAVENOUS; SUBCUTANEOUS at 13:55

## 2020-10-24 RX ADMIN — SODIUM BICARBONATE 50 MEQ: 84 INJECTION, SOLUTION INTRAVENOUS at 01:08

## 2020-10-24 RX ADMIN — VASOPRESSIN 0.04 UNITS/MIN: 20 INJECTION INTRAVENOUS at 03:01

## 2020-10-24 RX ADMIN — DEXTROSE 150 MG: 50 INJECTION, SOLUTION INTRAVENOUS at 01:18

## 2020-10-24 RX ADMIN — DEXTROSE MONOHYDRATE 50 ML: 500 INJECTION PARENTERAL at 01:08

## 2020-10-24 RX ADMIN — ALBUMIN (HUMAN) 25 G: 12.5 INJECTION, SOLUTION INTRAVENOUS at 04:22

## 2020-10-24 RX ADMIN — ALBUMIN (HUMAN) 12.5 G: 0.25 INJECTION, SOLUTION INTRAVENOUS at 00:30

## 2020-10-24 RX ADMIN — NOREPINEPHRINE BITARTRATE 20 MCG/MIN: 1 INJECTION INTRAVENOUS at 01:18

## 2020-10-24 RX ADMIN — RIFAXIMIN 550 MG: 550 TABLET ORAL at 08:57

## 2020-10-24 RX ADMIN — METHYLPREDNISOLONE SODIUM SUCCINATE 40 MG: 40 INJECTION, POWDER, FOR SOLUTION INTRAMUSCULAR; INTRAVENOUS at 02:00

## 2020-10-24 RX ADMIN — NOREPINEPHRINE BITARTRATE 29 MCG/MIN: 1 INJECTION INTRAVENOUS at 11:32

## 2020-10-24 RX ADMIN — INSULIN LISPRO 4 UNITS: 100 INJECTION, SOLUTION INTRAVENOUS; SUBCUTANEOUS at 05:37

## 2020-10-24 RX ADMIN — NOREPINEPHRINE BITARTRATE 30 MCG/MIN: 1 INJECTION INTRAVENOUS at 06:58

## 2020-10-24 RX ADMIN — LORAZEPAM 0.5 MG: 2 INJECTION INTRAMUSCULAR; INTRAVENOUS at 13:55

## 2020-10-24 RX ADMIN — GLYCOPYRROLATE 0.2 MG: 0.2 INJECTION, SOLUTION INTRAMUSCULAR; INTRAVENOUS at 13:55

## 2020-10-24 RX ADMIN — METOROPROLOL TARTRATE 5 MG: 5 INJECTION, SOLUTION INTRAVENOUS at 03:34

## 2020-10-24 RX ADMIN — CALCIUM GLUCONATE 2 G: 20 INJECTION, SOLUTION INTRAVENOUS at 01:05

## 2020-10-24 RX ADMIN — AMIODARONE HYDROCHLORIDE 0.5 MG/MIN: 50 INJECTION, SOLUTION INTRAVENOUS at 07:42

## 2020-10-24 RX ADMIN — OCTREOTIDE ACETATE 100 MCG: 100 INJECTION, SOLUTION INTRAVENOUS; SUBCUTANEOUS at 03:20

## 2020-10-24 RX ADMIN — NOREPINEPHRINE BITARTRATE 28 MCG/MIN: 1 INJECTION INTRAVENOUS at 08:57

## 2020-10-24 RX ADMIN — ALBUMIN (HUMAN) 12.5 G: 12.5 INJECTION, SOLUTION INTRAVENOUS at 03:00

## 2020-10-24 RX ADMIN — MIDODRINE HYDROCHLORIDE 10 MG: 5 TABLET ORAL at 08:57

## 2020-10-24 RX ADMIN — LIDOCAINE HYDROCHLORIDE 5 MG: 10 INJECTION, SOLUTION EPIDURAL; INFILTRATION; INTRACAUDAL; PERINEURAL at 00:17

## 2020-10-24 RX ADMIN — NICOTINE 1 PATCH: 14 PATCH TRANSDERMAL at 08:57

## 2020-10-24 RX ADMIN — PANTOPRAZOLE SODIUM 40 MG: 40 INJECTION, POWDER, FOR SOLUTION INTRAVENOUS at 08:57

## 2020-10-24 RX ADMIN — ALBUMIN (HUMAN) 12.5 G: 12.5 INJECTION, SOLUTION INTRAVENOUS at 08:57

## 2020-10-24 RX ADMIN — SODIUM BICARBONATE 100 ML/HR: 84 INJECTION, SOLUTION INTRAVENOUS at 02:01

## 2020-10-24 RX ADMIN — INSULIN HUMAN 10 UNITS: 100 INJECTION, SOLUTION PARENTERAL at 01:08

## 2020-10-25 VITALS
RESPIRATION RATE: 15 BRPM | BODY MASS INDEX: 30.59 KG/M2 | WEIGHT: 244.71 LBS | SYSTOLIC BLOOD PRESSURE: 100 MMHG | HEART RATE: 126 BPM | TEMPERATURE: 97.4 F | DIASTOLIC BLOOD PRESSURE: 67 MMHG | OXYGEN SATURATION: 89 %

## 2020-10-25 PROBLEM — I48.91 NEW ONSET ATRIAL FIBRILLATION (HCC): Status: ACTIVE | Noted: 2020-10-25

## 2020-10-25 LAB
ABO GROUP BLD BPU: NORMAL
BPU ID: NORMAL
UNIT DISPENSE STATUS: NORMAL
UNIT PRODUCT CODE: NORMAL
UNIT RH: NORMAL

## 2020-10-25 PROCEDURE — 99232 SBSQ HOSP IP/OBS MODERATE 35: CPT | Performed by: INTERNAL MEDICINE

## 2020-10-25 PROCEDURE — NC001 PR NO CHARGE: Performed by: INTERNAL MEDICINE

## 2020-10-25 PROCEDURE — 99238 HOSP IP/OBS DSCHRG MGMT 30/<: CPT | Performed by: INTERNAL MEDICINE

## 2020-10-25 RX ADMIN — HYDROMORPHONE HYDROCHLORIDE 0.5 MG: 1 INJECTION, SOLUTION INTRAMUSCULAR; INTRAVENOUS; SUBCUTANEOUS at 12:04

## 2020-10-25 RX ADMIN — HYDROMORPHONE HYDROCHLORIDE 0.5 MG: 1 INJECTION, SOLUTION INTRAMUSCULAR; INTRAVENOUS; SUBCUTANEOUS at 18:55

## 2020-10-25 RX ADMIN — LORAZEPAM 0.5 MG: 2 INJECTION INTRAMUSCULAR; INTRAVENOUS at 13:08

## 2020-10-26 LAB
ATRIAL RATE: 116 BPM
P AXIS: 59 DEGREES
PR INTERVAL: 172 MS
QRS AXIS: 44 DEGREES
QRSD INTERVAL: 84 MS
QT INTERVAL: 326 MS
QTC INTERVAL: 453 MS
T WAVE AXIS: 82 DEGREES
VENTRICULAR RATE: 116 BPM

## 2020-10-29 LAB
BACTERIA BLD CULT: NORMAL

## 2020-10-30 LAB — BACTERIA BLD CULT: NORMAL

## 2022-08-11 NOTE — ASSESSMENT & PLAN NOTE
· Likely secondary to volume loss from profuse diarrhea  · Creatinine now normalized ANGELES resolved status post IV hydration  · Continue to monitor Prophylactic antibiotic

## 2023-05-08 NOTE — ASSESSMENT & PLAN NOTE
· Follows with GI as an outpatient, last seen nearly 1 year ago  · No evidence of encephalopathy on exam, no asterixis noted  · LFT's stable  · CT abdomen pelvis showing moderate mesenteric stranding worse from previous CT study of unclear etiology and present for 1 year no evidence of thrombus patient with no acute abdominal symptoms outpatient follow-up with GI ambulatory referral upon discharge None